# Patient Record
Sex: MALE | Race: WHITE | NOT HISPANIC OR LATINO | Employment: OTHER | URBAN - NONMETROPOLITAN AREA
[De-identification: names, ages, dates, MRNs, and addresses within clinical notes are randomized per-mention and may not be internally consistent; named-entity substitution may affect disease eponyms.]

---

## 2023-03-20 DIAGNOSIS — I10 HYPERTENSION, UNSPECIFIED TYPE: ICD-10-CM

## 2023-03-20 RX ORDER — LISINOPRIL 40 MG/1
40 TABLET ORAL DAILY
COMMUNITY
End: 2023-03-20 | Stop reason: SDUPTHER

## 2023-03-20 RX ORDER — LISINOPRIL 40 MG/1
40 TABLET ORAL DAILY
Qty: 30 TABLET | Refills: 1 | Status: SHIPPED | OUTPATIENT
Start: 2023-03-20 | End: 2023-03-24 | Stop reason: SDUPTHER

## 2023-03-21 PROBLEM — F17.200 SMOKER: Status: ACTIVE | Noted: 2023-03-21

## 2023-03-21 PROBLEM — E78.5 HYPERLIPIDEMIA: Status: ACTIVE | Noted: 2023-03-21

## 2023-03-21 PROBLEM — R91.1 LUNG NODULE: Status: ACTIVE | Noted: 2023-03-21

## 2023-03-21 PROBLEM — I65.29 CAROTID STENOSIS: Status: ACTIVE | Noted: 2023-03-21

## 2023-03-21 PROBLEM — I73.9 PVD (PERIPHERAL VASCULAR DISEASE) (CMS-HCC): Status: ACTIVE | Noted: 2023-03-21

## 2023-03-21 PROBLEM — I71.20 THORACIC AORTIC ANEURYSM WITHOUT RUPTURE (CMS-HCC): Status: ACTIVE | Noted: 2023-03-21

## 2023-03-21 PROBLEM — F33.2 ENDOGENOUS DEPRESSION (MULTI): Status: ACTIVE | Noted: 2023-03-21

## 2023-03-21 PROBLEM — Z95.0 PACEMAKER, ARTIFICIAL: Status: ACTIVE | Noted: 2023-03-21

## 2023-03-21 PROBLEM — I44.2 COMPLETE AV BLOCK (MULTI): Status: ACTIVE | Noted: 2023-03-21

## 2023-03-21 PROBLEM — J43.9 EMPHYSEMA/COPD (MULTI): Status: ACTIVE | Noted: 2023-03-21

## 2023-03-21 PROBLEM — R07.81 RIB PAIN: Status: ACTIVE | Noted: 2023-03-21

## 2023-03-21 PROBLEM — R79.89 LOW VITAMIN B12 LEVEL: Status: ACTIVE | Noted: 2023-03-21

## 2023-03-21 PROBLEM — I45.10 RIGHT BUNDLE BRANCH BLOCK: Status: ACTIVE | Noted: 2023-03-21

## 2023-03-21 PROBLEM — N52.9 ERECTILE DYSFUNCTION: Status: ACTIVE | Noted: 2023-03-21

## 2023-03-21 PROBLEM — I44.4 LAFB (LEFT ANTERIOR FASCICULAR BLOCK): Status: ACTIVE | Noted: 2023-03-21

## 2023-03-21 PROBLEM — I10 BENIGN HYPERTENSION: Status: ACTIVE | Noted: 2023-03-21

## 2023-03-21 PROBLEM — D12.6 TUBULAR ADENOMA OF COLON: Status: ACTIVE | Noted: 2023-03-21

## 2023-03-21 RX ORDER — CHLORTHALIDONE 25 MG/1
1 TABLET ORAL DAILY
COMMUNITY
Start: 2022-03-11 | End: 2023-08-30 | Stop reason: SDUPTHER

## 2023-03-21 RX ORDER — GUAIFENESIN 600 MG/1
1 TABLET, EXTENDED RELEASE ORAL 2 TIMES DAILY PRN
COMMUNITY
Start: 2022-10-27 | End: 2023-03-24 | Stop reason: SDUPTHER

## 2023-03-21 RX ORDER — TIOTROPIUM BROMIDE 18 UG/1
CAPSULE ORAL; RESPIRATORY (INHALATION)
COMMUNITY
Start: 2018-10-25

## 2023-03-21 RX ORDER — ALBUTEROL SULFATE 90 UG/1
AEROSOL, METERED RESPIRATORY (INHALATION)
COMMUNITY

## 2023-03-21 RX ORDER — CYCLOBENZAPRINE HCL 5 MG
1 TABLET ORAL 3 TIMES DAILY PRN
COMMUNITY
Start: 2022-10-27 | End: 2023-03-24 | Stop reason: WASHOUT

## 2023-03-21 RX ORDER — ASPIRIN 81 MG/1
81 TABLET ORAL DAILY
COMMUNITY

## 2023-03-21 RX ORDER — AMLODIPINE BESYLATE 10 MG/1
1 TABLET ORAL DAILY
COMMUNITY
Start: 2019-04-01 | End: 2023-03-24 | Stop reason: SDUPTHER

## 2023-03-21 RX ORDER — MIRTAZAPINE 15 MG/1
TABLET, FILM COATED ORAL
COMMUNITY
Start: 2019-02-20 | End: 2023-08-30 | Stop reason: SDUPTHER

## 2023-03-21 RX ORDER — ATORVASTATIN CALCIUM 20 MG/1
1 TABLET, FILM COATED ORAL DAILY
COMMUNITY
Start: 2019-02-20 | End: 2023-08-30 | Stop reason: SDUPTHER

## 2023-03-24 ENCOUNTER — OFFICE VISIT (OUTPATIENT)
Dept: PRIMARY CARE | Facility: CLINIC | Age: 73
End: 2023-03-24
Payer: MEDICARE

## 2023-03-24 VITALS
WEIGHT: 205.7 LBS | HEART RATE: 64 BPM | BODY MASS INDEX: 29.45 KG/M2 | SYSTOLIC BLOOD PRESSURE: 128 MMHG | HEIGHT: 70 IN | DIASTOLIC BLOOD PRESSURE: 66 MMHG

## 2023-03-24 DIAGNOSIS — Z00.00 ROUTINE GENERAL MEDICAL EXAMINATION AT HEALTH CARE FACILITY: Primary | ICD-10-CM

## 2023-03-24 DIAGNOSIS — I71.21 ANEURYSM OF ASCENDING AORTA WITHOUT RUPTURE (CMS-HCC): ICD-10-CM

## 2023-03-24 DIAGNOSIS — F33.2 ENDOGENOUS DEPRESSION (MULTI): ICD-10-CM

## 2023-03-24 DIAGNOSIS — J43.8 OTHER EMPHYSEMA (MULTI): ICD-10-CM

## 2023-03-24 DIAGNOSIS — F17.200 SMOKER: ICD-10-CM

## 2023-03-24 DIAGNOSIS — Z95.0 PACEMAKER, ARTIFICIAL: ICD-10-CM

## 2023-03-24 DIAGNOSIS — E78.49 OTHER HYPERLIPIDEMIA: ICD-10-CM

## 2023-03-24 DIAGNOSIS — I10 HYPERTENSION, UNSPECIFIED TYPE: ICD-10-CM

## 2023-03-24 DIAGNOSIS — I65.29 STENOSIS OF CAROTID ARTERY, UNSPECIFIED LATERALITY: ICD-10-CM

## 2023-03-24 DIAGNOSIS — I10 BENIGN HYPERTENSION: ICD-10-CM

## 2023-03-24 DIAGNOSIS — D12.6 TUBULAR ADENOMA OF COLON: ICD-10-CM

## 2023-03-24 DIAGNOSIS — R79.89 LOW VITAMIN B12 LEVEL: ICD-10-CM

## 2023-03-24 PROBLEM — R07.81 RIB PAIN: Status: RESOLVED | Noted: 2023-03-21 | Resolved: 2023-03-24

## 2023-03-24 PROBLEM — N52.9 ERECTILE DYSFUNCTION: Status: RESOLVED | Noted: 2023-03-21 | Resolved: 2023-03-24

## 2023-03-24 PROCEDURE — 99214 OFFICE O/P EST MOD 30 MIN: CPT | Performed by: FAMILY MEDICINE

## 2023-03-24 PROCEDURE — 4004F PT TOBACCO SCREEN RCVD TLK: CPT | Performed by: FAMILY MEDICINE

## 2023-03-24 PROCEDURE — 3078F DIAST BP <80 MM HG: CPT | Performed by: FAMILY MEDICINE

## 2023-03-24 PROCEDURE — 1160F RVW MEDS BY RX/DR IN RCRD: CPT | Performed by: FAMILY MEDICINE

## 2023-03-24 PROCEDURE — G0439 PPPS, SUBSEQ VISIT: HCPCS | Performed by: FAMILY MEDICINE

## 2023-03-24 PROCEDURE — 1159F MED LIST DOCD IN RCRD: CPT | Performed by: FAMILY MEDICINE

## 2023-03-24 PROCEDURE — 1124F ACP DISCUSS-NO DSCNMKR DOCD: CPT | Performed by: FAMILY MEDICINE

## 2023-03-24 PROCEDURE — 3074F SYST BP LT 130 MM HG: CPT | Performed by: FAMILY MEDICINE

## 2023-03-24 PROCEDURE — 1170F FXNL STATUS ASSESSED: CPT | Performed by: FAMILY MEDICINE

## 2023-03-24 RX ORDER — LISINOPRIL 40 MG/1
40 TABLET ORAL DAILY
Qty: 90 TABLET | Refills: 1 | Status: SHIPPED | OUTPATIENT
Start: 2023-03-24 | End: 2023-08-30 | Stop reason: SDUPTHER

## 2023-03-24 RX ORDER — GUAIFENESIN 600 MG/1
600 TABLET, EXTENDED RELEASE ORAL 2 TIMES DAILY PRN
Qty: 60 TABLET | Refills: 3 | Status: SHIPPED | OUTPATIENT
Start: 2023-03-24

## 2023-03-24 RX ORDER — LANOLIN ALCOHOL/MO/W.PET/CERES
1000 CREAM (GRAM) TOPICAL DAILY
COMMUNITY

## 2023-03-24 RX ORDER — AMLODIPINE BESYLATE 10 MG/1
10 TABLET ORAL DAILY
Qty: 90 TABLET | Refills: 1 | Status: SHIPPED | OUTPATIENT
Start: 2023-03-24 | End: 2023-08-30 | Stop reason: SDUPTHER

## 2023-03-24 ASSESSMENT — PATIENT HEALTH QUESTIONNAIRE - PHQ9
SUM OF ALL RESPONSES TO PHQ9 QUESTIONS 1 AND 2: 0
2. FEELING DOWN, DEPRESSED OR HOPELESS: NOT AT ALL
1. LITTLE INTEREST OR PLEASURE IN DOING THINGS: NOT AT ALL

## 2023-03-24 ASSESSMENT — ACTIVITIES OF DAILY LIVING (ADL)
DOING_HOUSEWORK: INDEPENDENT
MANAGING_FINANCES: INDEPENDENT
TAKING_MEDICATION: INDEPENDENT
DRESSING: INDEPENDENT
BATHING: INDEPENDENT
GROCERY_SHOPPING: INDEPENDENT

## 2023-03-24 NOTE — PATIENT INSTRUCTIONS
Labs soon.  Recommend routine follow up at least every six months.  Call concerns.  If you decide you want to quit smoking, and want help let us know.

## 2023-03-30 ENCOUNTER — TELEPHONE (OUTPATIENT)
Dept: PRIMARY CARE | Facility: CLINIC | Age: 73
End: 2023-03-30

## 2023-03-30 ENCOUNTER — LAB (OUTPATIENT)
Dept: LAB | Facility: LAB | Age: 73
End: 2023-03-30
Payer: MEDICARE

## 2023-03-30 DIAGNOSIS — E78.49 OTHER HYPERLIPIDEMIA: ICD-10-CM

## 2023-03-30 DIAGNOSIS — R79.89 LOW VITAMIN B12 LEVEL: ICD-10-CM

## 2023-03-30 LAB
ALANINE AMINOTRANSFERASE (SGPT) (U/L) IN SER/PLAS: 24 U/L (ref 10–52)
ALBUMIN (G/DL) IN SER/PLAS: 4.3 G/DL (ref 3.4–5)
ALBUMIN (MG/L) IN URINE: 10.9 MG/L
ALBUMIN/CREATININE (UG/MG) IN URINE: 9.6 UG/MG CRT (ref 0–30)
ALKALINE PHOSPHATASE (U/L) IN SER/PLAS: 76 U/L (ref 33–136)
ANION GAP IN SER/PLAS: 11 MMOL/L (ref 10–20)
ASPARTATE AMINOTRANSFERASE (SGOT) (U/L) IN SER/PLAS: 19 U/L (ref 9–39)
BASOPHILS (10*3/UL) IN BLOOD BY AUTOMATED COUNT: 0.08 X10E9/L (ref 0–0.1)
BASOPHILS/100 LEUKOCYTES IN BLOOD BY AUTOMATED COUNT: 0.7 % (ref 0–2)
BILIRUBIN TOTAL (MG/DL) IN SER/PLAS: 0.5 MG/DL (ref 0–1.2)
CALCIUM (MG/DL) IN SER/PLAS: 9.4 MG/DL (ref 8.6–10.3)
CARBON DIOXIDE, TOTAL (MMOL/L) IN SER/PLAS: 27 MMOL/L (ref 21–32)
CHLORIDE (MMOL/L) IN SER/PLAS: 104 MMOL/L (ref 98–107)
CHOLESTEROL (MG/DL) IN SER/PLAS: 128 MG/DL (ref 0–199)
CHOLESTEROL IN HDL (MG/DL) IN SER/PLAS: 48 MG/DL
CHOLESTEROL/HDL RATIO: 2.7
COBALAMIN (VITAMIN B12) (PG/ML) IN SER/PLAS: 615 PG/ML (ref 211–911)
CREATININE (MG/DL) IN SER/PLAS: 1.24 MG/DL (ref 0.5–1.3)
CREATININE (MG/DL) IN URINE: 114 MG/DL (ref 20–370)
EOSINOPHILS (10*3/UL) IN BLOOD BY AUTOMATED COUNT: 0.17 X10E9/L (ref 0–0.4)
EOSINOPHILS/100 LEUKOCYTES IN BLOOD BY AUTOMATED COUNT: 1.6 % (ref 0–6)
ERYTHROCYTE DISTRIBUTION WIDTH (RATIO) BY AUTOMATED COUNT: 13.1 % (ref 11.5–14.5)
ERYTHROCYTE MEAN CORPUSCULAR HEMOGLOBIN CONCENTRATION (G/DL) BY AUTOMATED: 32.3 G/DL (ref 32–36)
ERYTHROCYTE MEAN CORPUSCULAR VOLUME (FL) BY AUTOMATED COUNT: 95 FL (ref 80–100)
ERYTHROCYTES (10*6/UL) IN BLOOD BY AUTOMATED COUNT: 4.59 X10E12/L (ref 4.5–5.9)
GFR MALE: 61 ML/MIN/1.73M2
GLUCOSE (MG/DL) IN SER/PLAS: 91 MG/DL (ref 74–99)
HEMATOCRIT (%) IN BLOOD BY AUTOMATED COUNT: 43.6 % (ref 41–52)
HEMOGLOBIN (G/DL) IN BLOOD: 14.1 G/DL (ref 13.5–17.5)
IMMATURE GRANULOCYTES/100 LEUKOCYTES IN BLOOD BY AUTOMATED COUNT: 0.4 % (ref 0–0.9)
LDL: 67 MG/DL (ref 0–99)
LEUKOCYTES (10*3/UL) IN BLOOD BY AUTOMATED COUNT: 10.7 X10E9/L (ref 4.4–11.3)
LYMPHOCYTES (10*3/UL) IN BLOOD BY AUTOMATED COUNT: 2.34 X10E9/L (ref 0.8–3)
LYMPHOCYTES/100 LEUKOCYTES IN BLOOD BY AUTOMATED COUNT: 21.9 % (ref 13–44)
MONOCYTES (10*3/UL) IN BLOOD BY AUTOMATED COUNT: 0.82 X10E9/L (ref 0.05–0.8)
MONOCYTES/100 LEUKOCYTES IN BLOOD BY AUTOMATED COUNT: 7.7 % (ref 2–10)
NEUTROPHILS (10*3/UL) IN BLOOD BY AUTOMATED COUNT: 7.22 X10E9/L (ref 1.6–5.5)
NEUTROPHILS/100 LEUKOCYTES IN BLOOD BY AUTOMATED COUNT: 67.7 % (ref 40–80)
PLATELETS (10*3/UL) IN BLOOD AUTOMATED COUNT: 201 X10E9/L (ref 150–450)
POTASSIUM (MMOL/L) IN SER/PLAS: 4.3 MMOL/L (ref 3.5–5.3)
PROTEIN TOTAL: 6.5 G/DL (ref 6.4–8.2)
RBC, URINE: 1 /HPF (ref 0–5)
SODIUM (MMOL/L) IN SER/PLAS: 138 MMOL/L (ref 136–145)
SQUAMOUS EPITHELIAL CELLS, URINE: 1 /HPF
THYROTROPIN (MIU/L) IN SER/PLAS BY DETECTION LIMIT <= 0.05 MIU/L: 0.59 MIU/L (ref 0.44–3.98)
TRIGLYCERIDE (MG/DL) IN SER/PLAS: 65 MG/DL (ref 0–149)
UREA NITROGEN (MG/DL) IN SER/PLAS: 23 MG/DL (ref 6–23)
VLDL: 13 MG/DL (ref 0–40)
WBC, URINE: 5 /HPF (ref 0–5)

## 2023-03-30 PROCEDURE — 80053 COMPREHEN METABOLIC PANEL: CPT

## 2023-03-30 PROCEDURE — 85025 COMPLETE CBC W/AUTO DIFF WBC: CPT

## 2023-03-30 PROCEDURE — 80061 LIPID PANEL: CPT

## 2023-03-30 PROCEDURE — 84443 ASSAY THYROID STIM HORMONE: CPT

## 2023-03-30 PROCEDURE — 36415 COLL VENOUS BLD VENIPUNCTURE: CPT

## 2023-03-30 PROCEDURE — 82043 UR ALBUMIN QUANTITATIVE: CPT

## 2023-03-30 PROCEDURE — 82607 VITAMIN B-12: CPT

## 2023-03-30 PROCEDURE — 81001 URINALYSIS AUTO W/SCOPE: CPT

## 2023-03-30 PROCEDURE — 82570 ASSAY OF URINE CREATININE: CPT

## 2023-03-30 NOTE — TELEPHONE ENCOUNTER
----- Message from Flory Hopson MD sent at 3/30/2023  2:39 PM EDT -----  Can you please let him know his labs came back fine.  Thanks.

## 2023-05-31 ENCOUNTER — TELEPHONE (OUTPATIENT)
Dept: PHARMACY | Facility: HOSPITAL | Age: 73
End: 2023-05-31
Payer: MEDICARE

## 2023-08-30 DIAGNOSIS — I10 BENIGN HYPERTENSION: ICD-10-CM

## 2023-08-30 DIAGNOSIS — E78.5 HYPERLIPIDEMIA, UNSPECIFIED HYPERLIPIDEMIA TYPE: ICD-10-CM

## 2023-08-30 DIAGNOSIS — I10 HYPERTENSION, UNSPECIFIED TYPE: ICD-10-CM

## 2023-08-30 DIAGNOSIS — F33.2 ENDOGENOUS DEPRESSION (MULTI): ICD-10-CM

## 2023-08-30 RX ORDER — AMLODIPINE BESYLATE 10 MG/1
10 TABLET ORAL DAILY
Qty: 90 TABLET | Refills: 0 | Status: SHIPPED | OUTPATIENT
Start: 2023-08-30 | End: 2024-03-04

## 2023-08-30 RX ORDER — CHLORTHALIDONE 25 MG/1
25 TABLET ORAL DAILY
Qty: 90 TABLET | Refills: 0 | Status: SHIPPED | OUTPATIENT
Start: 2023-08-30 | End: 2024-03-04

## 2023-08-30 RX ORDER — ATORVASTATIN CALCIUM 20 MG/1
20 TABLET, FILM COATED ORAL DAILY
Qty: 90 TABLET | Refills: 0 | Status: SHIPPED | OUTPATIENT
Start: 2023-08-30 | End: 2023-11-27

## 2023-08-30 RX ORDER — MIRTAZAPINE 15 MG/1
TABLET, FILM COATED ORAL
Qty: 90 TABLET | Refills: 0 | Status: SHIPPED | OUTPATIENT
Start: 2023-08-30 | End: 2023-11-27

## 2023-08-30 RX ORDER — LISINOPRIL 40 MG/1
40 TABLET ORAL DAILY
Qty: 90 TABLET | Refills: 0 | Status: SHIPPED | OUTPATIENT
Start: 2023-08-30 | End: 2024-01-16 | Stop reason: SDUPTHER

## 2023-09-21 ENCOUNTER — OFFICE VISIT (OUTPATIENT)
Dept: PRIMARY CARE | Facility: CLINIC | Age: 73
End: 2023-09-21
Payer: MEDICARE

## 2023-09-21 VITALS
DIASTOLIC BLOOD PRESSURE: 78 MMHG | HEART RATE: 60 BPM | HEIGHT: 70 IN | WEIGHT: 207.4 LBS | BODY MASS INDEX: 29.69 KG/M2 | SYSTOLIC BLOOD PRESSURE: 138 MMHG

## 2023-09-21 DIAGNOSIS — Z91.148 NON COMPLIANCE W MEDICATION REGIMEN: ICD-10-CM

## 2023-09-21 DIAGNOSIS — J43.8 OTHER EMPHYSEMA (MULTI): ICD-10-CM

## 2023-09-21 DIAGNOSIS — F17.200 SMOKER: ICD-10-CM

## 2023-09-21 DIAGNOSIS — Z23 NEED FOR INFLUENZA VACCINATION: ICD-10-CM

## 2023-09-21 DIAGNOSIS — F33.2 ENDOGENOUS DEPRESSION (MULTI): ICD-10-CM

## 2023-09-21 DIAGNOSIS — I44.2 COMPLETE AV BLOCK (MULTI): Primary | ICD-10-CM

## 2023-09-21 DIAGNOSIS — I10 BENIGN HYPERTENSION: ICD-10-CM

## 2023-09-21 PROBLEM — R79.89 LOW VITAMIN B12 LEVEL: Status: RESOLVED | Noted: 2023-03-21 | Resolved: 2023-09-21

## 2023-09-21 PROBLEM — I44.4 LAFB (LEFT ANTERIOR FASCICULAR BLOCK): Status: RESOLVED | Noted: 2023-03-21 | Resolved: 2023-09-21

## 2023-09-21 PROBLEM — I45.10 RIGHT BUNDLE BRANCH BLOCK: Status: RESOLVED | Noted: 2023-03-21 | Resolved: 2023-09-21

## 2023-09-21 PROCEDURE — 4004F PT TOBACCO SCREEN RCVD TLK: CPT | Performed by: FAMILY MEDICINE

## 2023-09-21 PROCEDURE — 3078F DIAST BP <80 MM HG: CPT | Performed by: FAMILY MEDICINE

## 2023-09-21 PROCEDURE — 1159F MED LIST DOCD IN RCRD: CPT | Performed by: FAMILY MEDICINE

## 2023-09-21 PROCEDURE — 3075F SYST BP GE 130 - 139MM HG: CPT | Performed by: FAMILY MEDICINE

## 2023-09-21 PROCEDURE — 99214 OFFICE O/P EST MOD 30 MIN: CPT | Performed by: FAMILY MEDICINE

## 2023-09-21 PROCEDURE — G0008 ADMIN INFLUENZA VIRUS VAC: HCPCS | Performed by: FAMILY MEDICINE

## 2023-09-21 PROCEDURE — 90662 IIV NO PRSV INCREASED AG IM: CPT | Performed by: FAMILY MEDICINE

## 2023-09-21 PROCEDURE — 1160F RVW MEDS BY RX/DR IN RCRD: CPT | Performed by: FAMILY MEDICINE

## 2023-09-21 NOTE — PROGRESS NOTES
Patient presents for periodic surveillance of chronic medical problems.     Subjective  Melecio Rivera is a 73 y.o. male who presents for Annual Exam.  HPI  Smoking cessation advised.  Not using spiriva daily, declines any other maintenance treatments for copd.  Tailbone hurts when he wakes up or if he walks.  Declines any further testing.  Sees cardiology and vascular for thoracic artery aneurysm, pvd and carotid stenosis  Depression, stable, wishes to remain on current regimen    Due for high dose influenza vaccine.    Review of Systems   All other systems reviewed and are negative.  .    Objective     Visit Vitals  /78 (BP Location: Left arm, Patient Position: Sitting)   Pulse 60      Physical Exam  Vitals and nursing note reviewed.   Constitutional:       General: He is not in acute distress.     Appearance: Normal appearance. He is not toxic-appearing.      Comments: Room smells of tobacco   HENT:      Head: Normocephalic and atraumatic.   Cardiovascular:      Rate and Rhythm: Normal rate and regular rhythm.      Heart sounds: No murmur heard.  Pulmonary:      Effort: Pulmonary effort is normal. No respiratory distress.      Breath sounds: Normal breath sounds.      Comments: Prolonged expiration  Musculoskeletal:      Cervical back: Neck supple. No rigidity.      Comments:     Skin:     General: Skin is warm and dry.   Neurological:      General: No focal deficit present.      Mental Status: He is alert and oriented to person, place, and time.   Psychiatric:         Mood and Affect: Mood normal.         Behavior: Behavior normal.         Assessment/Plan   Problem List Items Addressed This Visit       Benign hypertension    Relevant Orders    CBC and Auto Differential    Comprehensive Metabolic Panel    Lipid Panel    TSH with reflex to Free T4 if abnormal    Vitamin B12    RESOLVED: Complete AV block (CMS/HCC) - Primary    Emphysema/COPD (CMS/HCC)    Endogenous depression (CMS/HCC)    Smoker     Other  Visit Diagnoses       Need for influenza vaccination        Relevant Orders    Flu vaccine, quadrivalent, high-dose, preservative free, age 65y+ (FLUZONE) (Completed)    Non compliance w medication regimen                       Flory Hopson MD

## 2023-09-21 NOTE — PROGRESS NOTES
Med check; states when he first gets up has pain in his tailbone but it goes away once he is up and moving around

## 2023-10-31 ENCOUNTER — HOSPITAL ENCOUNTER (OUTPATIENT)
Dept: CARDIOLOGY | Facility: CLINIC | Age: 73
Discharge: HOME | End: 2023-10-31
Payer: MEDICARE

## 2023-10-31 DIAGNOSIS — Z95.0 PACEMAKER, ARTIFICIAL: ICD-10-CM

## 2023-10-31 DIAGNOSIS — I44.2 ATRIOVENTRICULAR BLOCK, COMPLETE (MULTI): ICD-10-CM

## 2023-10-31 PROCEDURE — 93296 REM INTERROG EVL PM/IDS: CPT

## 2023-11-14 PROCEDURE — 93294 REM INTERROG EVL PM/LDLS PM: CPT | Performed by: INTERNAL MEDICINE

## 2023-11-23 DIAGNOSIS — E78.5 HYPERLIPIDEMIA, UNSPECIFIED HYPERLIPIDEMIA TYPE: ICD-10-CM

## 2023-11-27 RX ORDER — ATORVASTATIN CALCIUM 20 MG/1
20 TABLET, FILM COATED ORAL DAILY
Qty: 90 TABLET | Refills: 1 | Status: SHIPPED | OUTPATIENT
Start: 2023-11-27

## 2024-01-16 DIAGNOSIS — I10 HYPERTENSION, UNSPECIFIED TYPE: ICD-10-CM

## 2024-01-16 RX ORDER — LISINOPRIL 40 MG/1
40 TABLET ORAL DAILY
Qty: 90 TABLET | Refills: 1 | Status: SHIPPED | OUTPATIENT
Start: 2024-01-16

## 2024-01-18 DIAGNOSIS — Z95.0 PACEMAKER, ARTIFICIAL: Primary | ICD-10-CM

## 2024-01-18 DIAGNOSIS — I44.2 ATRIOVENTRICULAR BLOCK, COMPLETE (MULTI): ICD-10-CM

## 2024-01-23 ENCOUNTER — APPOINTMENT (OUTPATIENT)
Dept: CARDIOLOGY | Facility: HOSPITAL | Age: 74
End: 2024-01-23
Payer: MEDICARE

## 2024-01-30 ENCOUNTER — HOSPITAL ENCOUNTER (OUTPATIENT)
Dept: CARDIOLOGY | Facility: CLINIC | Age: 74
Discharge: HOME | End: 2024-01-30
Payer: MEDICARE

## 2024-01-30 DIAGNOSIS — Z95.0 PACEMAKER, ARTIFICIAL: ICD-10-CM

## 2024-01-30 DIAGNOSIS — I44.2 ATRIOVENTRICULAR BLOCK, COMPLETE (MULTI): ICD-10-CM

## 2024-01-30 PROCEDURE — 93296 REM INTERROG EVL PM/IDS: CPT

## 2024-02-15 ENCOUNTER — HOSPITAL ENCOUNTER (OUTPATIENT)
Dept: CARDIOLOGY | Facility: HOSPITAL | Age: 74
Discharge: HOME | End: 2024-02-15
Payer: MEDICARE

## 2024-02-15 ENCOUNTER — LAB (OUTPATIENT)
Dept: LAB | Facility: LAB | Age: 74
End: 2024-02-15
Payer: MEDICARE

## 2024-02-15 DIAGNOSIS — Z95.0 PACEMAKER, ARTIFICIAL: Primary | ICD-10-CM

## 2024-02-15 DIAGNOSIS — I44.2 ATRIOVENTRICULAR BLOCK, COMPLETE (MULTI): ICD-10-CM

## 2024-02-15 DIAGNOSIS — Z95.0 PACEMAKER, ARTIFICIAL: ICD-10-CM

## 2024-02-15 DIAGNOSIS — I10 BENIGN HYPERTENSION: ICD-10-CM

## 2024-02-15 LAB
ALBUMIN SERPL BCP-MCNC: 4.1 G/DL (ref 3.4–5)
ALP SERPL-CCNC: 80 U/L (ref 33–136)
ALT SERPL W P-5'-P-CCNC: 19 U/L (ref 10–52)
ANION GAP SERPL CALC-SCNC: 10 MMOL/L (ref 10–20)
AST SERPL W P-5'-P-CCNC: 17 U/L (ref 9–39)
BASOPHILS # BLD AUTO: 0.07 X10*3/UL (ref 0–0.1)
BASOPHILS NFR BLD AUTO: 0.7 %
BILIRUB SERPL-MCNC: 0.6 MG/DL (ref 0–1.2)
BUN SERPL-MCNC: 35 MG/DL (ref 6–23)
CALCIUM SERPL-MCNC: 9.3 MG/DL (ref 8.6–10.3)
CHLORIDE SERPL-SCNC: 105 MMOL/L (ref 98–107)
CHOLEST SERPL-MCNC: 117 MG/DL (ref 0–199)
CHOLESTEROL/HDL RATIO: 3.7
CO2 SERPL-SCNC: 28 MMOL/L (ref 21–32)
CREAT SERPL-MCNC: 1.49 MG/DL (ref 0.5–1.3)
EGFRCR SERPLBLD CKD-EPI 2021: 49 ML/MIN/1.73M*2
EOSINOPHIL # BLD AUTO: 0.15 X10*3/UL (ref 0–0.4)
EOSINOPHIL NFR BLD AUTO: 1.4 %
ERYTHROCYTE [DISTWIDTH] IN BLOOD BY AUTOMATED COUNT: 11.9 % (ref 11.5–14.5)
GLUCOSE SERPL-MCNC: 89 MG/DL (ref 74–99)
HCT VFR BLD AUTO: 45.1 % (ref 41–52)
HDLC SERPL-MCNC: 32 MG/DL
HGB BLD-MCNC: 14.8 G/DL (ref 13.5–17.5)
IMM GRANULOCYTES # BLD AUTO: 0.05 X10*3/UL (ref 0–0.5)
IMM GRANULOCYTES NFR BLD AUTO: 0.5 % (ref 0–0.9)
LDLC SERPL CALC-MCNC: 64 MG/DL
LYMPHOCYTES # BLD AUTO: 2.04 X10*3/UL (ref 0.8–3)
LYMPHOCYTES NFR BLD AUTO: 19.4 %
MCH RBC QN AUTO: 30.9 PG (ref 26–34)
MCHC RBC AUTO-ENTMCNC: 32.8 G/DL (ref 32–36)
MCV RBC AUTO: 94 FL (ref 80–100)
MONOCYTES # BLD AUTO: 0.76 X10*3/UL (ref 0.05–0.8)
MONOCYTES NFR BLD AUTO: 7.2 %
NEUTROPHILS # BLD AUTO: 7.42 X10*3/UL (ref 1.6–5.5)
NEUTROPHILS NFR BLD AUTO: 70.8 %
NON HDL CHOLESTEROL: 85 MG/DL (ref 0–149)
NRBC BLD-RTO: 0 /100 WBCS (ref 0–0)
PLATELET # BLD AUTO: 178 X10*3/UL (ref 150–450)
POTASSIUM SERPL-SCNC: 4.3 MMOL/L (ref 3.5–5.3)
PROT SERPL-MCNC: 6.7 G/DL (ref 6.4–8.2)
RBC # BLD AUTO: 4.79 X10*6/UL (ref 4.5–5.9)
SODIUM SERPL-SCNC: 139 MMOL/L (ref 136–145)
TRIGL SERPL-MCNC: 107 MG/DL (ref 0–149)
TSH SERPL-ACNC: 0.65 MIU/L (ref 0.44–3.98)
VIT B12 SERPL-MCNC: 227 PG/ML (ref 211–911)
VLDL: 21 MG/DL (ref 0–40)
WBC # BLD AUTO: 10.5 X10*3/UL (ref 4.4–11.3)

## 2024-02-15 PROCEDURE — 80053 COMPREHEN METABOLIC PANEL: CPT

## 2024-02-15 PROCEDURE — 93280 PM DEVICE PROGR EVAL DUAL: CPT

## 2024-02-15 PROCEDURE — 85025 COMPLETE CBC W/AUTO DIFF WBC: CPT

## 2024-02-15 PROCEDURE — 84443 ASSAY THYROID STIM HORMONE: CPT

## 2024-02-15 PROCEDURE — 36415 COLL VENOUS BLD VENIPUNCTURE: CPT

## 2024-02-15 PROCEDURE — 82607 VITAMIN B-12: CPT

## 2024-02-15 PROCEDURE — 93280 PM DEVICE PROGR EVAL DUAL: CPT | Performed by: STUDENT IN AN ORGANIZED HEALTH CARE EDUCATION/TRAINING PROGRAM

## 2024-02-15 PROCEDURE — 80061 LIPID PANEL: CPT

## 2024-03-02 DIAGNOSIS — I10 BENIGN HYPERTENSION: ICD-10-CM

## 2024-03-02 DIAGNOSIS — I10 HYPERTENSION, UNSPECIFIED TYPE: ICD-10-CM

## 2024-03-04 RX ORDER — AMLODIPINE BESYLATE 10 MG/1
10 TABLET ORAL DAILY
Qty: 90 TABLET | Refills: 1 | Status: SHIPPED | OUTPATIENT
Start: 2024-03-04

## 2024-03-04 RX ORDER — CHLORTHALIDONE 25 MG/1
25 TABLET ORAL DAILY
Qty: 90 TABLET | Refills: 1 | Status: SHIPPED | OUTPATIENT
Start: 2024-03-04

## 2024-03-21 ENCOUNTER — OFFICE VISIT (OUTPATIENT)
Dept: PRIMARY CARE | Facility: CLINIC | Age: 74
End: 2024-03-21
Payer: MEDICARE

## 2024-03-21 VITALS
DIASTOLIC BLOOD PRESSURE: 70 MMHG | HEIGHT: 70 IN | HEART RATE: 68 BPM | BODY MASS INDEX: 30.35 KG/M2 | SYSTOLIC BLOOD PRESSURE: 132 MMHG | WEIGHT: 212 LBS

## 2024-03-21 DIAGNOSIS — I10 BENIGN HYPERTENSION: ICD-10-CM

## 2024-03-21 DIAGNOSIS — G89.29 CHRONIC BILATERAL LOW BACK PAIN WITHOUT SCIATICA: ICD-10-CM

## 2024-03-21 DIAGNOSIS — Z00.00 ROUTINE GENERAL MEDICAL EXAMINATION AT HEALTH CARE FACILITY: Primary | ICD-10-CM

## 2024-03-21 DIAGNOSIS — J43.8 OTHER EMPHYSEMA (MULTI): ICD-10-CM

## 2024-03-21 DIAGNOSIS — N18.31 STAGE 3A CHRONIC KIDNEY DISEASE (MULTI): ICD-10-CM

## 2024-03-21 DIAGNOSIS — R82.90 ABNORMAL URINALYSIS: ICD-10-CM

## 2024-03-21 DIAGNOSIS — Z11.59 NEED FOR HEPATITIS C SCREENING TEST: ICD-10-CM

## 2024-03-21 DIAGNOSIS — F17.200 SMOKER: ICD-10-CM

## 2024-03-21 DIAGNOSIS — F33.2 ENDOGENOUS DEPRESSION (MULTI): ICD-10-CM

## 2024-03-21 DIAGNOSIS — M54.50 CHRONIC BILATERAL LOW BACK PAIN WITHOUT SCIATICA: ICD-10-CM

## 2024-03-21 DIAGNOSIS — I71.21 ANEURYSM OF ASCENDING AORTA WITHOUT RUPTURE (CMS-HCC): ICD-10-CM

## 2024-03-21 PROCEDURE — 87086 URINE CULTURE/COLONY COUNT: CPT

## 2024-03-21 PROCEDURE — 3078F DIAST BP <80 MM HG: CPT | Performed by: FAMILY MEDICINE

## 2024-03-21 PROCEDURE — 3075F SYST BP GE 130 - 139MM HG: CPT | Performed by: FAMILY MEDICINE

## 2024-03-21 PROCEDURE — G0439 PPPS, SUBSEQ VISIT: HCPCS | Performed by: FAMILY MEDICINE

## 2024-03-21 PROCEDURE — 99214 OFFICE O/P EST MOD 30 MIN: CPT | Performed by: FAMILY MEDICINE

## 2024-03-21 PROCEDURE — 1170F FXNL STATUS ASSESSED: CPT | Performed by: FAMILY MEDICINE

## 2024-03-21 PROCEDURE — 1124F ACP DISCUSS-NO DSCNMKR DOCD: CPT | Performed by: FAMILY MEDICINE

## 2024-03-21 PROCEDURE — 1160F RVW MEDS BY RX/DR IN RCRD: CPT | Performed by: FAMILY MEDICINE

## 2024-03-21 PROCEDURE — 36415 COLL VENOUS BLD VENIPUNCTURE: CPT

## 2024-03-21 PROCEDURE — 1159F MED LIST DOCD IN RCRD: CPT | Performed by: FAMILY MEDICINE

## 2024-03-21 ASSESSMENT — PATIENT HEALTH QUESTIONNAIRE - PHQ9
1. LITTLE INTEREST OR PLEASURE IN DOING THINGS: NOT AT ALL
SUM OF ALL RESPONSES TO PHQ9 QUESTIONS 1 AND 2: 0
2. FEELING DOWN, DEPRESSED OR HOPELESS: NOT AT ALL
2. FEELING DOWN, DEPRESSED OR HOPELESS: NOT AT ALL

## 2024-03-21 ASSESSMENT — ACTIVITIES OF DAILY LIVING (ADL)
TAKING_MEDICATION: INDEPENDENT
MANAGING_FINANCES: INDEPENDENT
GROCERY_SHOPPING: INDEPENDENT
BATHING: INDEPENDENT
DOING_HOUSEWORK: INDEPENDENT
DRESSING: INDEPENDENT

## 2024-03-21 NOTE — PROGRESS NOTES
"Subjective   Reason for Visit: Melecio Rivera is an 74 y.o. male here for a Medicare Wellness visit.     Past Medical, Surgical, and Family History reviewed and updated in chart.    Reviewed all medications by prescribing practitioner or clinical pharmacist (such as prescriptions, OTCs, herbal therapies and supplements) and documented in the medical record.    HPI  Low back pain for quite awhile, worse with movement, not sure how long, better with rest. No numbness/tingling down legs, no loss of bowel or bladder control, no urinary symptoms  Smokes, warned of risks, advised to quit, declines additional workup /treatment/testing for copd  Recommend he wear his hearing aids, reviewed risk reduction for dementia with that  Stage 3 kidney disease, discussed avoiding nsaids  Sees vascular for thoracic aortic aneurysm    Reviewed labs  Patient Care Team:  Flory Hopson MD as PCP - General (Family Medicine)  Flory Hopson MD as PCP - [employer] PCP     Review of Systems    Objective   Vitals:  /70 (BP Location: Left arm, Patient Position: Sitting)   Pulse 68   Ht 1.778 m (5' 10\")   Wt 96.2 kg (212 lb)   BMI 30.42 kg/m²       Physical Exam  Constitutional:       Appearance: He is not toxic-appearing.      Comments: Chronically ill appearing, room smells of cigarettes   HENT:      Head: Normocephalic.      Comments: Hard of hearing, no hearing aids today  Cardiovascular:      Rate and Rhythm: Normal rate and regular rhythm.   Pulmonary:      Effort: Pulmonary effort is normal.      Breath sounds: Normal breath sounds.      Comments: Prolonged expiration  Neurological:      General: No focal deficit present.      Mental Status: He is alert.         Assessment/Plan   Problem List Items Addressed This Visit       Benign hypertension    Emphysema/COPD (CMS/[facility])    Overview     Flory Hopson  Jan 05, 202110:26AM  Chronic Condition Documentation: Stable based on symptoms and exam. Continue established " treatment plan and follow-up at least yearly.         Endogenous depression (CMS/HCC)    Thoracic aortic aneurysm without rupture (CMS/HCC)    Overview     Flory Hopson  Jan 05, 202110:26AM  Chronic Condition Documentation: Stable based on symptoms and exam. Continue established treatment plan and follow-up at least yearly.         Smoker    Overview     20 cigarettes perday. Started age 18.         Stage 3a chronic kidney disease (CMS/HCC)     Other Visit Diagnoses       Routine general medical examination at health care facility    -  Primary    Chronic bilateral low back pain without sciatica        Relevant Orders    XR lumbar spine 2-3 views    Referral to Physical Therapy    Microscopic Only, Urine    Urine Culture    Abnormal urinalysis        Relevant Orders    Urine Culture    Need for hepatitis C screening test        Relevant Orders    Hepatitis C antibody

## 2024-03-22 LAB — BACTERIA UR CULT: NORMAL

## 2024-04-10 ENCOUNTER — EVALUATION (OUTPATIENT)
Dept: PHYSICAL THERAPY | Facility: CLINIC | Age: 74
End: 2024-04-10
Payer: MEDICARE

## 2024-04-10 DIAGNOSIS — G89.29 CHRONIC BILATERAL LOW BACK PAIN WITHOUT SCIATICA: Primary | ICD-10-CM

## 2024-04-10 DIAGNOSIS — M54.50 CHRONIC BILATERAL LOW BACK PAIN WITHOUT SCIATICA: Primary | ICD-10-CM

## 2024-04-10 PROCEDURE — 97161 PT EVAL LOW COMPLEX 20 MIN: CPT | Mod: GP | Performed by: PHYSICAL THERAPIST

## 2024-04-10 PROCEDURE — 97110 THERAPEUTIC EXERCISES: CPT | Mod: GP | Performed by: PHYSICAL THERAPIST

## 2024-04-10 ASSESSMENT — PAIN SCALES - GENERAL: PAINLEVEL_OUTOF10: 0 - NO PAIN

## 2024-04-10 ASSESSMENT — ENCOUNTER SYMPTOMS
DEPRESSION: 1
OCCASIONAL FEELINGS OF UNSTEADINESS: 1
LOSS OF SENSATION IN FEET: 0

## 2024-04-10 ASSESSMENT — PAIN - FUNCTIONAL ASSESSMENT: PAIN_FUNCTIONAL_ASSESSMENT: 0-10

## 2024-04-10 NOTE — PROGRESS NOTES
"Physical Therapy    Physical Therapy Lumbar Spine Evaluation    Patient Name: Melecio Rivera  MRN: [id number]  Today's Date: 4/10/2024  Time Calculation  Start Time: 1410  Stop Time: 1509  Time Calculation (min): 59 min  PT Evaluation Time Entry  PT Evaluation (Low) Time Entry: 26  PT Therapeutic Procedures Time Entry  Therapeutic Exercise Time Entry: 29      Current Problem  Problem List Items Addressed This Visit             ICD-10-CM    Chronic bilateral low back pain without sciatica - Primary M54.50, G89.29    Relevant Orders    Follow Up In Physical Therapy        SUBJECTIVE  Subjective   General  Reason for Referral: back pain  Referred By: Mark AnthonyPatient reports pain in lower back on both sides of spine \"near my kidneys\" and states everything feels like it's dropped down \"my stomach and stuff\".  C/o numbness/tingling in right thigh.  States sometimes \"it falls asleep\". States he has more pain after he walks for a while and when bending over.  States since he retired he \"doesn't do much\".  Patient reports son helps him fix things around the house.  Patient states he has knee problems too which inhibit him.     Precautions  Precautions  STEADI Fall Risk Score (The score of 4 or more indicates an increased risk of falling): 4  Medical Precautions: No known precautions/limitation       Pain  Pain Assessment: 0-10  Pain Score: 0 - No pain (at rest sitting with no pain;  states ; states when he gets pain \"it's an aggravation--it's there\".  \"it's not pain where you cant stand it...it's an ache\")  Location of pain: lower back, both sides    SUBJECTIVE:   Patient verified by name and .  Chief complaint: back pain    Imaging: has xray ordered on 3/21/24 but hasn't gotten it done yet    Pain Better; sitting/resting    Pain Worse: lifting, prolonged walking    Prior level of function painfree/less pain       Current limitations: walking distance, lifting/bending     Work: retired    Patients goal: less/no pain  Prior tx: " "none    Objective:  Standing: iliac crest height level, increased fullness along lower thoracic to lumbar paraspinals, significant tightness in lumbar paraspinals bilaterally, decreased lumbar lordosis    Lower Extremity ROM: WNL     Lower Extremity Strength:  Date: eval Myotome RIGHT LEFT   Hip Flexion L1,2 4 4   Hip ext  4 4   Hip abd      Hip add      Knee Extension L3 4+ 4+   Knee Flexion  4 4   Ankle DF L4 4+ 4+   Great toe Ext L5       Lumbar ROM:  50% deficit all    Neurological symptoms tingling/numbness R thigh    Outcome Measure  Other Measures  Oswestry Disablity Index (CONOR): 13  Other Measures  Oswestry Disablity Index (CONOR): 13    26% impairment    [facility]  TrA sobeida plantigrade with t/v cues and instructed for HEP  Hamstring seated stretch 30\" ea and HEP  Adductor standing stretch  30\" ea and HEP  Hip flexor (on chair) stretch  30\" ea and HEP  Gastroc standing stretch at wall  30\" ea and HEP  Body mechanics and back safety training: Tra sobeida with ADLS for back support, lifting mechanics    OP EDUCATION:  Access Code: [email or web address]  URL: [email or web address]  Date: 04/10/2024  Prepared by: Radha Espinal    Exercises  - Seated Hamstring Stretch with Chair  - 2 x daily - 7 x weekly - 1 sets - 2 reps - 30 sec hold  - Lateral Lunge Adductor Stretch with Counter Support  - 2 x daily - 7 x weekly - 1 sets - 2 reps - 30 sec hold  - Standing Hip Flexor Stretch on Chair  - 2 x daily - 7 x weekly - 1 sets - 2 reps - 30 sec hold  - Gastroc Stretch on Wall  - 2 x daily - 7 x weekly - 1 sets - 2 reps - 30 sec hold  - Quadruped Transversus Abdominis Bracing  - 2 x daily - 7 x weekly - 1 sets - 10 reps  - Supine Single Knee to Chest Stretch  - 2 x daily - 7 x weekly - 1 sets - 10 reps - 10 hold  - Supine Double Knee to Chest  - 2 x daily - 7 x weekly - 1 sets - 3 reps - 10 hold  ASSESSEMENT  Pt is a 74 y.o. referred to therapy for dx of LBP by Flory Hopson MD . Pt presents with " impairments in body mechanics and back safety, core and trunk weakness with muscle imbalance and tightness in Les musculature.  Patient has back pain symptoms with prolonged ambulation and lifting and bending.  He has significant tightness noted in lumbar region with fullness in R lumbar paraspinals more than left side.  Patient will need further education and review of proper body mechanics and back safety.  He had difficulty performing stretches and needed tactile cues and facilition to complete them with correct form.   Evaluation/Treatment Tolerance: Patient tolerated treatment well  Patient would benefit from a therapy program to restore prior level of function, decrease pain, increase AROM, increase strength and improve posture.    The physical therapy prognosis is good for the patient to achieve their goals.   The pt tolerated therapy treatment today well with no adverse effects.  Barriers to therapy include:  none    PLAN  PT Frequency: 2 times per week  Duration: 4 weeks      The pt has been educated about the risks and benefits of physical therapy including manual therapy treatments and gives consent for treatment.     The patient will benefit from physical therapy treatment to include: Treatment/Interventions: Dry needling, Education/ Instruction, Electrical stimulation, Manual therapy, Neuromuscular re-education, Self care/ home management, Therapeutic exercises (may include IASTM, cupping, dry needle back /lumbar area as needed)       Goals:  Active       PT Problem       PT Goal 1       Start:  04/10/24    Expected End:  05/30/24       Patient will be independent with HEP to improve strength, flexibility, and to reduce pain...   4 weeks    Patient will demonstrate good body mechanics and back safety with floor to waist lift, front carry, overhead lift...  4 weeks    Patient will report 50% less episodes (frequency and duration ) of tingling in R thigh...  4 weeks    Patient will have CONOR score of 10%  or less...  4 weeks

## 2024-04-18 ENCOUNTER — TREATMENT (OUTPATIENT)
Dept: PHYSICAL THERAPY | Facility: CLINIC | Age: 74
End: 2024-04-18
Payer: MEDICARE

## 2024-04-18 DIAGNOSIS — M54.50 CHRONIC BILATERAL LOW BACK PAIN WITHOUT SCIATICA: ICD-10-CM

## 2024-04-18 DIAGNOSIS — G89.29 CHRONIC BILATERAL LOW BACK PAIN WITHOUT SCIATICA: ICD-10-CM

## 2024-04-18 PROCEDURE — 97110 THERAPEUTIC EXERCISES: CPT | Mod: GP,CQ

## 2024-04-18 PROCEDURE — 97140 MANUAL THERAPY 1/> REGIONS: CPT | Mod: GP,CQ

## 2024-04-18 ASSESSMENT — PAIN SCALES - GENERAL: PAINLEVEL_OUTOF10: 5 - MODERATE PAIN

## 2024-04-18 ASSESSMENT — PAIN - FUNCTIONAL ASSESSMENT: PAIN_FUNCTIONAL_ASSESSMENT: 0-10

## 2024-04-18 NOTE — PROGRESS NOTES
Physical Therapy Treatment    Patient Name: Melecio Rivera  MRN: [id number]  Today's Date: 4/18/2024  Time Calculation  Start Time: 1215  Stop Time: 1255  Time Calculation (min): 40 min    PT Therapeutic Procedures Time Entry  Manual Therapy Time Entry: 15  Therapeutic Exercise Time Entry: 24         Subjective  Patient reports  25% compliance with HEP and expressed good understanding. Patient states that sitting relieves his back pain within 3 seconds. States that he isn't sure he can do the therapy exercises secondary to B/L knee pain. Decreased pain at end of session.          Precautions  Precautions  Medical Precautions: No known precautions/limitation  Pain  Pain Assessment: 0-10  Pain Score: 5 - Moderate pain  Pain Type: Chronic pain  Pain Location: Back    Assessment: Patient identified by name and date of birth. Performed supine core strengthening secondary to patient c/o of knee pain with standing exercises. Patient was challenged with TrA contraction, verbal and tactile cues required with patient continuing to compensate. Relief expressed with LTR. Performed manual STW to B/L Qls and paraspinals with patient demonstrating tenderness and increased tissue restriction, Improvement noted at the end of session.         Plan: Plan to continue with core strengthening to allow for improved trunk control and safety with lifting objects with ADLs. JW    OP PT Plan  Treatment/Interventions: Dry needling, Education/ Instruction, Electrical stimulation, Manual therapy, Neuromuscular re-education, Self care/ home management, Therapeutic exercises (may include IASTM, cupping, dry needle back /lumbar area as needed)  PT Plan: Skilled PT  PT Frequency: 2 times per week  Duration: 4 weeks  Onset Date: 04/10/23  Certification Period Start Date:  (need auth)  Rehab Potential: Good    Current Problem  1. Chronic bilateral low back pain without sciatica  Follow Up In Physical Therapy          Reason for Referral: back  "pain  Referred By: Mark Anthony  General Comment: 2/44    Treatments: 24'  Slant board 2 x 30\" N  Supine TrA contraction 10 x 5\" N  Supine March w/TrA 2x10  N  LTR w/TrA 10 x 3\" holds N    SKTC 10 x 5\" holds P  DKTC 5 x 5\" holds P    Manual: 15'  STW to B/L QL, paraspinals    Below for HEP  TrA sobeida plantigrade with t/v cues and instructed for HEP  Hamstring seated stretch 30\" ea and HEP  Adductor standing stretch  30\" ea and HEP  Hip flexor (on chair) stretch  30\" ea and HEP  Gastroc standing stretch at wall  30\" ea and HEP  Body mechanics and back safety training: Tra sobeida with ADLS for back support, lifting mechanics     OP EDUCATION:  Access Code: [email or web address]  URL: [email or web address]  Date: 04/10/2024  Prepared by: Radha Espinal     Exercises  - Seated Hamstring Stretch with Chair  - 2 x daily - 7 x weekly - 1 sets - 2 reps - 30 sec hold  - Lateral Lunge Adductor Stretch with Counter Support  - 2 x daily - 7 x weekly - 1 sets - 2 reps - 30 sec hold  - Standing Hip Flexor Stretch on Chair  - 2 x daily - 7 x weekly - 1 sets - 2 reps - 30 sec hold  - Gastroc Stretch on Wall  - 2 x daily - 7 x weekly - 1 sets - 2 reps - 30 sec hold  - Quadruped Transversus Abdominis Bracing  - 2 x daily - 7 x weekly - 1 sets - 10 reps  - Supine Single Knee to Chest Stretch  - 2 x daily - 7 x weekly - 1 sets - 10 reps - 10 hold  - Supine Double Knee to Chest  - 2 x daily - 7 x weekly - 1 sets - 3 reps - 10 hold  Access Code: [email or web address]  URL: [email or web address]  Date: 04/18/2024  Prepared by: Jolene Mccloud    Exercises  - Supine Transversus Abdominis Bracing - Hands on Stomach  - 1 x daily - 7 x weekly - 10 reps - 5\" hold  - Supine March  - 1 x daily - 7 x weekly - 3 sets - 10 reps  - Supine Lower Trunk Rotation  - 1 x daily - 7 x weekly - 10 reps - 5\" hold    ASSESSEMENT    Outpatient Education  Individual(s) Educated: Patient  Education Provided: Home Exercise Program  Diagnosis and " Precautions: low back pain without sciatica; no prec  Risk and Benefits Discussed with Patient/Caregiver/Other: yes  Patient/Caregiver Demonstrated Understanding: yes  Plan of Care Discussed and Agreed Upon: yes  Patient Response to Education: Patient/Caregiver Verbalized Understanding of Information, Patient/Caregiver Performed Return Demonstration of Exercises/Activities, Patient/Caregiver Asked Appropriate Questions  Education Comment: handout also given    Goals:  Active       PT Problem       PT Goal 1       Start:  04/10/24    Expected End:  05/30/24       Patient will be independent with HEP to improve strength, flexibility, and to reduce pain...   4 weeks    Patient will demonstrate good body mechanics and back safety with floor to waist lift, front carry, overhead lift...  4 weeks    Patient will report 50% less episodes (frequency and duration ) of tingling in R thigh...  4 weeks    Patient will have CONOR score of 10% or less...  4 weeks

## 2024-04-24 ENCOUNTER — TREATMENT (OUTPATIENT)
Dept: PHYSICAL THERAPY | Facility: CLINIC | Age: 74
End: 2024-04-24
Payer: MEDICARE

## 2024-04-24 DIAGNOSIS — G89.29 CHRONIC BILATERAL LOW BACK PAIN WITHOUT SCIATICA: ICD-10-CM

## 2024-04-24 DIAGNOSIS — M54.50 CHRONIC BILATERAL LOW BACK PAIN WITHOUT SCIATICA: ICD-10-CM

## 2024-04-24 PROCEDURE — 97110 THERAPEUTIC EXERCISES: CPT | Mod: GP,CQ

## 2024-04-24 ASSESSMENT — PAIN - FUNCTIONAL ASSESSMENT: PAIN_FUNCTIONAL_ASSESSMENT: 0-10

## 2024-04-24 ASSESSMENT — PAIN SCALES - GENERAL: PAINLEVEL_OUTOF10: 4

## 2024-04-24 NOTE — PROGRESS NOTES
"Physical Therapy Treatment    Patient Name: Melecio Rivera  MRN: [id number]  Today's Date: 4/24/2024  Time Calculation  Start Time: 1451  Stop Time: 1532  Time Calculation (min): 41 min  PT Therapeutic Procedures Time Entry  Therapeutic Exercise Time Entry: 39       Assessment:   Patient able to tolerate session with mild difficulty. Patient brenda's challenges with standing QL stretch on R compared to L. Patient challenged with SLR. Demo's no change in symptoms pre and post treatment.    Plan:  Continue to work on improving strength and decreasing pain to be able to get a full nights sleep uninterrupted. -AB.     OP PT Plan  Treatment/Interventions: Dry needling, Education/ Instruction, Electrical stimulation, Manual therapy, Neuromuscular re-education, Self care/ home management, Therapeutic exercises (may include IASTM, cupping, dry needle back /lumbar area as needed)  PT Plan: Skilled PT  PT Frequency: 2 times per week  Duration: 4 weeks  Onset Date: 04/10/23  Certification Period Start Date:  (need auth)  Rehab Potential: Good    Current Problem  Problem List Items Addressed This Visit             ICD-10-CM    Chronic bilateral low back pain without sciatica M54.50, G89.29       Subjective   General  Reason for Referral: back pain  Referred By: Mark Anthony  General Comment: 3/4  HEP: Not much  Patient states that his pain isn't constant.     Precautions  Precautions  Medical Precautions: No known precautions/limitation    Pain  Pain Assessment: 0-10  Pain Score: 4  Pain Location: Other (Comment) (Tailbone)    Objective     Treatments:  Therapeutic Exercise: 39 minutes, 3 units  SciFit x6' (N)  Slant board 2 x 1' (P, hold time)   Step ups 6\" step x15 Bilat Fwd (N)  Seated HS stretch 3x30\" Bilat (N)  Supine TrA contraction 10 x 5\"   Supine March w/TrA 2x10    LTR w/TrA 10 x 5\" holds     SKTC 10 x 5\" holds   DKTC 10 x 5\" holds (P, reps)   SLR x10 Bilat (N)  Standing QL stretch x30\" bilat (N)    Manual: Not today: " 4/24/24  STW to B/L QL, paraspinals    OP EDUCATION:       Goals:  Active       PT Problem       PT Goal 1       Start:  04/10/24    Expected End:  05/30/24       Patient will be independent with HEP to improve strength, flexibility, and to reduce pain...   4 weeks    Patient will demonstrate good body mechanics and back safety with floor to waist lift, front carry, overhead lift...  4 weeks    Patient will report 50% less episodes (frequency and duration ) of tingling in R thigh...  4 weeks    Patient will have CONOR score of 10% or less...  4 weeks

## 2024-04-30 ENCOUNTER — TREATMENT (OUTPATIENT)
Dept: PHYSICAL THERAPY | Facility: CLINIC | Age: 74
End: 2024-04-30
Payer: MEDICARE

## 2024-04-30 ENCOUNTER — HOSPITAL ENCOUNTER (OUTPATIENT)
Dept: CARDIOLOGY | Facility: CLINIC | Age: 74
Discharge: HOME | End: 2024-04-30
Payer: MEDICARE

## 2024-04-30 DIAGNOSIS — Z95.0 PACEMAKER, ARTIFICIAL: ICD-10-CM

## 2024-04-30 DIAGNOSIS — I44.2 ATRIOVENTRICULAR BLOCK, COMPLETE (MULTI): ICD-10-CM

## 2024-04-30 DIAGNOSIS — G89.29 CHRONIC BILATERAL LOW BACK PAIN WITHOUT SCIATICA: ICD-10-CM

## 2024-04-30 DIAGNOSIS — M54.50 CHRONIC BILATERAL LOW BACK PAIN WITHOUT SCIATICA: ICD-10-CM

## 2024-04-30 PROCEDURE — 97110 THERAPEUTIC EXERCISES: CPT | Mod: GP | Performed by: PHYSICAL THERAPIST

## 2024-04-30 ASSESSMENT — PAIN - FUNCTIONAL ASSESSMENT: PAIN_FUNCTIONAL_ASSESSMENT: 0-10

## 2024-04-30 NOTE — PROGRESS NOTES
"Physical Therapy Treatment    Patient Name: Melecio Rivera  MRN: [id number]  Today's Date: 4/30/2024  Time Calculation  Start Time: 1345  Stop Time: 1425  Time Calculation (min): 40 min      PT Therapeutic Procedures Time Entry  Therapeutic Exercise Time Entry: 39                         General  Reason for Referral: back pain  Referred By: Mark Anthony  General Comment: 4/5    Assessment:   Pt had good overall tolerance to exercises performed in treatment today.  Pt was challenged with ex's performed.  Good form with all ex's performed with minimal cueing needed.     Plan:  OP PT Plan  Treatment/Interventions: Dry needling, Education/ Instruction, Electrical stimulation, Manual therapy, Neuromuscular re-education, Self care/ home management, Therapeutic exercises (may include IASTM, cupping, dry needle back /lumbar area as needed)  PT Plan: Skilled PT  PT Frequency: 2 times per week  Duration: 4 weeks  Onset Date: 04/10/23  Certification Period Start Date:  (need auth)  Rehab Potential: Good    Continue to work on improving strength and decreasing pain to be able to get a full nights sleep uninterrupted.     Current Problem  Problem List Items Addressed This Visit             ICD-10-CM       Musculoskeletal and Injuries    Chronic bilateral low back pain without sciatica M54.50, G89.29       Subjective  Pt reports that his LBP comes and goes.  Pt reports pain is more mild currently but that the pain gets worse with more he has to stand.  Pt reports that he has significant b/l knee pain worse on the R knee that he feels makes his back pain worse.      Precautions  Precautions  Medical Precautions: No known precautions/limitation    Pain  Pain Assessment: 0-10  Pain Type: Chronic pain  Pain Location: Back  Pain Orientation: Lower      Treatments:  Therapeutic Exercise: 39 minutes, 3 units  SciFit x6'   Slant board 2 x 1'   Step ups 6\" step x15 Bilat Fwd   Seated HS stretch 3x30\" Bilat   Supine TrA contraction 10 x 5\"   Supine " "March w/TrA 2x10    SLR x10 Bilat  LTR w/TrA 10 x 5\" holds   SKTC 10 x 5\" holds   DKTC 10 x 5\" holds    Standing QL stretch x30\" bilat X     Manual:  Not today 4/24/24  STW to B/L QL, paraspinals    OP EDUCATION:   Edu on proper form and speed of movement with ex's.  Pt verbalized/demonstrated good understanding.      Goals:  Active       PT Problem       PT Goal 1       Start:  04/10/24    Expected End:  05/30/24       Patient will be independent with HEP to improve strength, flexibility, and to reduce pain...   4 weeks    Patient will demonstrate good body mechanics and back safety with floor to waist lift, front carry, overhead lift...  4 weeks    Patient will report 50% less episodes (frequency and duration ) of tingling in R thigh...  4 weeks    Patient will have CONOR score of 10% or less...  4 weeks            "

## 2024-05-02 ENCOUNTER — TREATMENT (OUTPATIENT)
Dept: PHYSICAL THERAPY | Facility: CLINIC | Age: 74
End: 2024-05-02
Payer: MEDICARE

## 2024-05-02 DIAGNOSIS — G89.29 CHRONIC BILATERAL LOW BACK PAIN WITHOUT SCIATICA: ICD-10-CM

## 2024-05-02 DIAGNOSIS — M54.50 CHRONIC BILATERAL LOW BACK PAIN WITHOUT SCIATICA: ICD-10-CM

## 2024-05-02 PROCEDURE — 97110 THERAPEUTIC EXERCISES: CPT | Mod: GP | Performed by: PHYSICAL THERAPIST

## 2024-05-02 ASSESSMENT — PAIN - FUNCTIONAL ASSESSMENT: PAIN_FUNCTIONAL_ASSESSMENT: 0-10

## 2024-05-02 ASSESSMENT — PAIN SCALES - GENERAL: PAINLEVEL_OUTOF10: 2

## 2024-05-02 NOTE — PROGRESS NOTES
Physical Therapy Discharge/Treatment    Patient Name: Melecio Rivera  MRN: [id number]  Today's Date: 5/2/2024  Time Calculation  Start Time: 1330  Stop Time: 1357  Time Calculation (min): 27 min      PT Therapeutic Procedures Time Entry  Therapeutic Exercise Time Entry: 25                         General  Reason for Referral: back pain  Referred By: Mark Anthony  General Comment: Visit # 5/5    Assessment:   The pt has made good objective progress in PT with improved lumbar/core/LE strength, ROM/flexibility.  The pt has MET or PARTIALLY MET most of his PT goals and is I with current HEP.  DC PT at this time.  Follow up with MD as needed.      Plan:  OP PT Plan  Treatment/Interventions: Dry needling, Education/ Instruction, Electrical stimulation, Manual therapy, Neuromuscular re-education, Self care/ home management, Therapeutic exercises (may include IASTM, cupping, dry needle back /lumbar area as needed)  PT Plan: Skilled PT  PT Frequency: 2 times per week  Duration: 4 weeks  Onset Date: 04/10/23  Certification Period Start Date:  (need auth)  Rehab Potential: Good    5/2/2024 PT DC Summary:    The pt has made good objective progress in PT with improved lumbar/core/LE strength, ROM/flexibility.  The pt has MET or PARTIALLY MET most of his PT goals and is I with current HEP.  DC PT at this time.  Follow up with MD as needed.        Current Problem  Problem List Items Addressed This Visit             ICD-10-CM       Musculoskeletal and Injuries    Chronic bilateral low back pain without sciatica M54.50, G89.29       Subjective  Pt reports that his low back is doing better overall but that he still has some pain if he overdoes it with activity.  Pt reports that he feels good about DC today and will continue with his ex's as able moving forward.      Precautions  Precautions  Medical Precautions: No known precautions/limitation    Pain  Pain Assessment: 0-10  Pain Score: 2  Pain Type: Chronic pain  Pain Location: Back  Pain  "Orientation: Lower      Treatments:  Therapeutic Exercise: 25 minutes, 2 units  SciFit x6'   Slant board 2 x 1'   Step ups 6\" step x15 Bilat Fwd   Seated HS stretch 3x30\" Bilat   5/2/2024 PT DC Summary Completed.      Following Ex's X Today:  Supine TrA contraction 10 x 5\"   Supine March w/TrA 2x10    SLR x10 Bilat  LTR w/TrA 10 x 5\" holds   SKTC 10 x 5\" holds   DKTC 10 x 5\" holds    Standing QL stretch x30\" bilat X     Manual:  Not today 4/24/24  STW to B/L QL, paraspinals    OP EDUCATION:   Edu on proper form and speed of movement with ex's.  Pt verbalized/demonstrated good understanding.      Goals:  Active       PT Problem       PT Goal 1       Start:  04/10/24    Expected End:  05/30/24       Patient will be independent with HEP to improve strength, flexibility, and to reduce pain...   4 weeks  5/2/2024, MET, pt is I with HEP and has handouts as a reference.     Patient will demonstrate good body mechanics and back safety with floor to waist lift, front carry, overhead lift...  4 weeks  5/2/2024, PARTIALLY MET, pt reports that he does his best to use safe body mechanics but that he is not always intentional about it.      Patient will report 50% less episodes (frequency and duration ) of tingling in R thigh...  4 weeks  5/2/2024, PARTIALLY MET, pt reports 25% less occurrence of R thigh numbness/tingling    Patient will have CONOR score of 10% or less...  4 weeks  5/2/2024, NOT MET, pt scored a 13% at baseline and a 20% at DC            "

## 2024-07-14 DIAGNOSIS — E78.5 HYPERLIPIDEMIA, UNSPECIFIED HYPERLIPIDEMIA TYPE: ICD-10-CM

## 2024-07-15 RX ORDER — ATORVASTATIN CALCIUM 20 MG/1
20 TABLET, FILM COATED ORAL DAILY
Qty: 90 TABLET | Refills: 1 | Status: SHIPPED | OUTPATIENT
Start: 2024-07-15

## 2024-07-30 ENCOUNTER — HOSPITAL ENCOUNTER (OUTPATIENT)
Dept: CARDIOLOGY | Facility: CLINIC | Age: 74
Discharge: HOME | End: 2024-07-30
Payer: MEDICARE

## 2024-07-30 DIAGNOSIS — Z95.0 PACEMAKER, ARTIFICIAL: ICD-10-CM

## 2024-07-30 DIAGNOSIS — I44.2 ATRIOVENTRICULAR BLOCK, COMPLETE (MULTI): ICD-10-CM

## 2024-07-30 PROCEDURE — 93296 REM INTERROG EVL PM/IDS: CPT

## 2024-08-01 DIAGNOSIS — I71.21 ANEURYSM OF ASCENDING AORTA WITHOUT RUPTURE (CMS-HCC): ICD-10-CM

## 2024-08-05 ENCOUNTER — TELEPHONE (OUTPATIENT)
Dept: CARDIOLOGY | Facility: CLINIC | Age: 74
End: 2024-08-05
Payer: MEDICARE

## 2024-08-05 DIAGNOSIS — I71.20 THORACIC AORTIC ANEURYSM WITHOUT RUPTURE, UNSPECIFIED PART (CMS-HCC): ICD-10-CM

## 2024-08-05 NOTE — TELEPHONE ENCOUNTER
"Secure message from Julissa     \"Patient to be seen on Wednesday and was supposed to have testing on 8/1, but no showed. Can we reschedule testing and push out appointment? \"    LM for pt to call office back.      "

## 2024-08-07 ENCOUNTER — APPOINTMENT (OUTPATIENT)
Dept: CARDIOLOGY | Facility: CLINIC | Age: 74
End: 2024-08-07
Payer: MEDICARE

## 2024-08-08 DIAGNOSIS — I71.20 THORACIC AORTIC ANEURYSM WITHOUT RUPTURE, UNSPECIFIED PART (CMS-HCC): Primary | ICD-10-CM

## 2024-08-12 ENCOUNTER — LAB (OUTPATIENT)
Dept: LAB | Facility: LAB | Age: 74
End: 2024-08-12
Payer: MEDICARE

## 2024-08-12 ENCOUNTER — HOSPITAL ENCOUNTER (OUTPATIENT)
Dept: RADIOLOGY | Facility: HOSPITAL | Age: 74
Discharge: HOME | End: 2024-08-12
Payer: MEDICARE

## 2024-08-12 DIAGNOSIS — I71.20 THORACIC AORTIC ANEURYSM WITHOUT RUPTURE, UNSPECIFIED PART (CMS-HCC): ICD-10-CM

## 2024-08-12 DIAGNOSIS — I71.21 ANEURYSM OF ASCENDING AORTA WITHOUT RUPTURE (CMS-HCC): ICD-10-CM

## 2024-08-12 LAB
ANION GAP SERPL CALC-SCNC: 10 MMOL/L (ref 10–20)
BUN SERPL-MCNC: 21 MG/DL (ref 6–23)
CALCIUM SERPL-MCNC: 9.2 MG/DL (ref 8.6–10.3)
CHLORIDE SERPL-SCNC: 108 MMOL/L (ref 98–107)
CO2 SERPL-SCNC: 26 MMOL/L (ref 21–32)
CREAT SERPL-MCNC: 1.48 MG/DL (ref 0.5–1.3)
EGFRCR SERPLBLD CKD-EPI 2021: 49 ML/MIN/1.73M*2
GLUCOSE SERPL-MCNC: 106 MG/DL (ref 74–99)
POTASSIUM SERPL-SCNC: 4.3 MMOL/L (ref 3.5–5.3)
SODIUM SERPL-SCNC: 140 MMOL/L (ref 136–145)

## 2024-08-12 PROCEDURE — 36415 COLL VENOUS BLD VENIPUNCTURE: CPT

## 2024-08-12 PROCEDURE — 80048 BASIC METABOLIC PNL TOTAL CA: CPT

## 2024-08-12 PROCEDURE — 71275 CT ANGIOGRAPHY CHEST: CPT | Performed by: INTERNAL MEDICINE

## 2024-08-12 PROCEDURE — 71275 CT ANGIOGRAPHY CHEST: CPT

## 2024-08-12 PROCEDURE — 2550000001 HC RX 255 CONTRASTS

## 2024-08-16 NOTE — PROGRESS NOTES
CHIEF COMPLAINT  Follow up testing     HISTORY OF PRESENT ILLNESS    Cardiovascular hx:    TAA  -Recent CTA of chest showing stable fusiform aneurysmal dilatation of the ascending aorta measuring 4.1 cm   -Current medical therapy includes ASA 81mg and high-intensity statin  -BP is stable; patient does check at home and reports systolic between 130-140; current medical therapy includes lisinopril 40mg daily and chlorthalidone 25mg daily, amlodipine 10mg daily   -Patient smokes approximately 2 ppd with no intentions of quitting; CT of chest showing stable 3mm lung nodule     2. PAD:  -Hx of bilateral SFA intervention for claudication  -Current medical regimen includes ASA 81mg and high-intensity statin    3. Carotid artery stenosis, right, asymptomatic:  -Carotid duplex (October, 2021)-showing <50% stenosis bilaterally   -Current medical regimen includes ASA 81mg and statin          Past Medical, Surgical, and Family History reviewed and updated in chart.     Reviewed all medications by prescribing practitioner or clinical pharmacist (such as prescriptions, OTCs, herbal therapies and supplements) and documented in the medical record.    Past Medical History  Past Medical History:   Diagnosis Date    Abnormal findings on diagnostic imaging of other specified body structures     Abnormal CT of the chest    Abnormal findings on diagnostic imaging of other specified body structures     Abnormal CT of the chest    Complete AV block (Multi) 03/21/2023    LAFB (left anterior fascicular block) 03/21/2023    Low vitamin B12 level 03/21/2023    Personal history of other diseases of the circulatory system 02/11/2022    History of complete atrioventricular block    Personal history of other medical treatment     History of echocardiogram    Right bundle branch block 03/21/2023       Social History  Social History     Tobacco Use    Smoking status: Every Day     Types: Cigarettes    Smokeless tobacco: Never   Substance Use  Topics    Alcohol use: Not Currently    Drug use: Never       Family History     Family History   Problem Relation Name Age of Onset    Cancer Mother      Alzheimer's disease Father      Cancer Sister          Allergies:  No Known Allergies     Outpatient Medications:  Current Outpatient Medications   Medication Instructions    albuterol 90 mcg/actuation inhaler INHALE 2 PUFFS BY MOUTH 4 TIMES DAILY AS NEEDED FOR WHEEZING    amLODIPine (NORVASC) 10 mg, oral, Daily    aspirin 81 mg, oral, Daily    atorvastatin (LIPITOR) 20 mg, oral, Daily    chlorthalidone (HYGROTON) 25 mg, oral, Daily    cyanocobalamin (VITAMIN B-12) 1,000 mcg, oral, Daily    diphenhydrAMINE (BENADRYL) 50 mg, oral, Nightly PRN    guaiFENesin (MUCINEX) 600 mg, oral, 2 times daily PRN    lisinopril 40 mg, oral, Daily, as directed    mirtazapine (Remeron) 15 mg tablet TAKE 1 TAB(S) ORAL ONCE A DAY (AT BEDTIME)    tiotropium (Spiriva with HandiHaler) 18 mcg inhalation capsule INHALE 1 CAPSULE VIA HANDIHALER ONCE DAILY AT THE SAME TIME EVERY DAY          Labs:   CMP:  Recent Labs     08/12/24  0919 02/15/24  1222 03/30/23  0935 03/11/22  1155 07/06/21  1116 03/12/20  0530 03/11/20  0535    139 138 141 138   < > 140   K 4.3 4.3 4.3 4.4 4.3   < > 3.9   * 105 104 108* 105   < > 110*   CO2 26 28 27 26 28   < > 22   ANIONGAP 10 10 11 11 9*   < > 12   BUN 21 35* 23 23 15   < > 19   CREATININE 1.48* 1.49* 1.24 1.08 1.03   < > 1.08   EGFR 49* 49*  --   --   --   --   --    MG  --   --   --   --   --   --  2.22    < > = values in this interval not displayed.     Recent Labs     02/15/24  1222 03/30/23  0935 07/06/21  1116 07/13/20  0904 03/11/20  0535   ALBUMIN 4.1 4.3 4.5 4.5 3.8   ALKPHOS 80 76 99 94 75   ALT 19 24 29 29 17   AST 17 19 23 24 14   BILITOT 0.6 0.5 0.7 0.7 0.6     CBC:  Recent Labs     02/15/24  1222 03/30/23  0935 03/11/22  1155 07/06/21  1116 07/13/20  0904   WBC 10.5 10.7 7.1 8.4 8.4   HGB 14.8 14.1 14.5 16.1 15.5   HCT 45.1 43.6  "42.6 48.0 46.6    201 178 185 168   MCV 94 95 91 94 93     COAG:   Recent Labs     03/10/20  1236   INR 1.0     ABO: No results for input(s): \"ABO\" in the last 15882 hours.  HEME/ENDO:  Recent Labs     02/15/24  1222 03/30/23  0935 07/06/21  1116 07/13/20  0904   TSH 0.65 0.59 1.59 1.10      CARDIAC: No results for input(s): \"LDH\", \"CKMB\", \"TROPHS\", \"BNP\" in the last 76957 hours.    No lab exists for component: \"CK\", \"CKMBP\"  Recent Labs     02/15/24  1222 03/30/23  0935 07/06/21  1116 07/13/20  0904   CHOL 117 128 133 141   LDLF  --  67 70 74   HDL 32.0 48.0 51.0 48.0   TRIG 107 65 60 95     MICRO: No results for input(s): \"ESR\", \"CRP\", \"PROCAL\" in the last 28192 hours.  No results found for the last 90 days.    Notable Studies: imaging personally reviewed   EKG:No results found for this or any previous visit (from the past 4464 hour(s)).  Echocardiogram:   Echocardiogram     [facility], [address] [place], [place] [address]Tel [phone] and  Fax [phone]    TRANSTHORACIC ECHOCARDIOGRAM REPORT      Patient Name:     DONTA Duarte Physician:  [id number] Alec Lopez MD  Study Date:       3/11/2020          Referring           Magno Cosme MD  Physician:  MRN/PID:          [id number]           PCP:  Accession/Order#: [id number]          Department          [facility] floor [facility]  Location:           Center  YOB: 1950           Fellow:  Gender:           M                  Nurse:  Admit Date:       3/10/2020          Sonographer:        DAKOTA Valerio RDCS  Admission Status: Inpatient -        Additional Staff:  Priority discharge  Height:           177.80 cm          CC Report to:       [facility] 1st floor [facility]  Center [facility]  Weight:           87.09 kg           Study Type:         Echocardiogram  BSA:              2.05 m2  Blood Pressure: 142 /69 mmHg    Diagnosis/ICD: R00.1-Bradycardia, unspecified  Indication:    Abnormal EKG  Procedure/CPT: Echo " Complete w Full Doppler-[id number]    Patient History:  Pertinent         HTN, Hyperlipidemia and Syncope. Bradycardia, PAD, PVD, AV  History:          block.    Study Detail: The following Echo studies were performed: 2D, M-Mode, Doppler and  color flow. Technically challenging study due to poor acoustic  windows and patient lying in supine position. Definity used as a  contrast agent for endocardial border definition. Total contrast  used for this procedure was 2 mL via IV push. Patient has a  pacemaker.      PHYSICIAN INTERPRETATION:  Left Ventricle: The left ventricular systolic function is normal, with an estimated ejection fraction of 55-60%. There are no regional wall motion abnormalities. The left ventricular cavity size is normal. Spectral Doppler shows an impaired relaxation pattern of left ventricular diastolic filling.  Left Atrium: The left atrium is normal in size.  Right Ventricle: The right ventricle is normal in size. There is normal right ventricular global systolic function. A device is visualized in the right ventricle.  Right Atrium: The right atrium is normal in size. There is a device visualized in the right atrium.  Aortic Valve: The aortic valve is trileaflet. There is trivial aortic valve regurgitation. The peak instantaneous gradient of the aortic valve is 6.4 mmHg.  Mitral Valve: The mitral valve is normal in structure. There is trace mitral valve regurgitation.  Tricuspid Valve: The tricuspid valve is structurally normal. There is trace tricuspid regurgitation.  Pulmonic Valve: The pulmonic valve is structurally normal. There is trace pulmonic valve regurgitation.  Pericardium: There is no pericardial effusion noted.  Aorta: The aortic root is normal.  Systemic Veins: The inferior vena cava appears to be of normal size. There is IVC inspiratory collapse greater than 50%.  In comparison to the previous echocardiogram(s): There are no prior studies on this patient for comparison  purposes.      CONCLUSIONS:  1. The left ventricular systolic function is normal with a 55-60% estimated ejection fraction.  2. Spectral Doppler shows an impaired relaxation pattern of left ventricular diastolic filling.    QUANTITATIVE DATA SUMMARY:  2D MEASUREMENTS:  Normal Ranges:  LAs:           3.67 cm   (2.7-4.0cm)  IVSd:          1.05 cm   (0.6-1.1cm)  LVPWd:         0.75 cm   (0.6-1.1cm)  LVIDd:         4.44 cm   (3.9-5.9cm)  LVIDs:         2.50 cm  LV Mass Index: 63.1 g/m2  LV % FS        43.7 %    LA VOLUME:  Normal Ranges:  LA Vol A4C:        52.0 ml    (22+/-6mL/m2)  LA Vol A2C:        48.2 ml  LA Vol BP:         51.0 ml  LA Vol Index A4C:  25.3 ml/m2  LA Vol Index A2C:  23.5 ml/m2  LA Vol Index BP:   24.9 ml/m2  LA Area A4C:       18.0 cm2  LA Area A2C:       17.0 cm2  LA Major Axis A4C: 5.3 cm  LA Major Axis A2C: 5.1 cm  LA Volume Index:   23.0 ml/m2    RA VOLUME BY A/L METHOD:  Normal Ranges:  RA Vol A4C:        32.6 ml    (8.3-19.5ml)  RA Vol Index A4C:  15.9 ml/m2  RA Area A4C:       13.0 cm2  RA Major Axis A4C: 4.4 cm    M-MODE MEASUREMENTS:  Normal Ranges:  Ao Root: 3.30 cm (2.0-3.7cm)  LAs:     4.01 cm (2.7-4.0cm)    AORTA MEASUREMENTS:  Normal Ranges:  Asc Ao, d: 3.80 cm (2.1-3.4cm)    LV SYSTOLIC FUNCTION BY 2D PLANIMETRY (MOD):  Normal Ranges:  EF-A4C View: 52.6 % (>55%)  EF-A2C View: 63.2 %  EF-Biplane:  59.1 %    AORTIC VALVE:  Normal Ranges:  AoV Vmax:      1.27 m/s (<1.7m/s)  AoV Peak P.4 mmHg (<20mmHg)  LVOT Max Leroy:  1.05 m/s (<1.1m/s)  LVOT VTI:      17.58 cm  LVOT Diameter: 2.06 cm  (1.8-2.4cm)  AoV Area,Vmax: 2.75 cm2 (2.5-4.5cm2)    RIGHT VENTRICLE:  RV 1   2.9 cm  TAPSE: 25.0 mm  RV s'  0.14 m/s    TRICUSPID VALVE/RVSP:  Normal Ranges:  IVC Diam: 1.50 cm    PULMONIC VALVE:  Normal Ranges:  PV Max Leroy: 0.8 m/s  (0.6-0.9m/s)  PV Max P.7 mmHg    AORTA:  Asc Ao Diam 3.82 cm      [id number] Alec Lopez MD  Electronically signed on 3/11/2020 at 4:38:50 PM      Stress  Testing: No results found for this or any previous visit from the past 1825 days.    Cardiac Catheterization: No results found for this or any previous visit from the past 1825 days.  No results found for this or any previous visit from the past 3650 days.         REVIEW OF SYSTEMS  A 10-point system review was completed and was negative except as noted in the HPI.      VITALS  Vitals:    08/21/24 0908   BP: 142/70   Pulse: 60   SpO2: 98%       PHYSICAL EXAM  General: awake, alert and oriented. No acute distress.   Skin: Skin is warm, dry and intact without rashes or lesions.  HEENT: normocephalic, atraumatic; conjunctivae are clear without exudates or hemorrhage. Sclera is non-icteric. Eyelids are normal in appearance without swelling or lesions. Hearing intact. Nares are patent bilaterally. Moist mucous membranes.   Cardiovascular: heart rate and rhythm are normal. No murmurs, gallops, or rubs are auscultated. S1 and S2 are heard and are of normal intensity. No JVD, no carotid bruits  Respiratory: diminished to bases  Gastrointestinal: non-distended, non-tender  Genitourinary: exam deferred  Musculoskeletal: ROM intact, no deformities  Extremities: pulses palpable bilaterally; no swelling or erythema  Neurological: no focal deficits; gait steady  Psychiatric: appropriate mood and affect; good judgment and insight          ASSESSMENT AND PLAN  Assessment/Plan   Diagnoses and all orders for this visit:  Aneurysm of ascending aorta without rupture (CMS-HCC)  Smoker  Benign hypertension  -Reviewed recent CTA of chest showing stable 4.1 cm TAA; will repeat imaging in 1 year  -BP is stable; continue anti-hypertensive regimen with no changes at this time         -Unfortunately, patient continues to smoke with no intentions of quitting; will inform PCP of imaging showing a stable 3mm lung nodule         -Continue ASA and statin   Carotid artery stenosis, asymptomatic, bilateral      -Continue ASA and statin         RTC: 1  year      Thank you for allowing me to participate in the care of this patient. Please reach me out if you have any questions or if you need any clarifications regarding the patient's care.    Julissa Foster DNP, APRN, FNP-C  Division of Cardiovascular Medicine  [facility]  [facility]

## 2024-08-21 ENCOUNTER — APPOINTMENT (OUTPATIENT)
Dept: CARDIOLOGY | Facility: CLINIC | Age: 74
End: 2024-08-21
Payer: MEDICARE

## 2024-08-21 VITALS
BODY MASS INDEX: 29.69 KG/M2 | HEIGHT: 70 IN | DIASTOLIC BLOOD PRESSURE: 70 MMHG | HEART RATE: 60 BPM | OXYGEN SATURATION: 98 % | SYSTOLIC BLOOD PRESSURE: 142 MMHG | WEIGHT: 207.4 LBS

## 2024-08-21 DIAGNOSIS — I10 BENIGN HYPERTENSION: ICD-10-CM

## 2024-08-21 DIAGNOSIS — I71.21 ANEURYSM OF ASCENDING AORTA WITHOUT RUPTURE (CMS-HCC): Primary | ICD-10-CM

## 2024-08-21 DIAGNOSIS — I65.23 CAROTID ARTERY STENOSIS, ASYMPTOMATIC, BILATERAL: ICD-10-CM

## 2024-08-21 DIAGNOSIS — F17.200 SMOKER: ICD-10-CM

## 2024-08-21 PROCEDURE — 3078F DIAST BP <80 MM HG: CPT

## 2024-08-21 PROCEDURE — 1159F MED LIST DOCD IN RCRD: CPT

## 2024-08-21 PROCEDURE — 1160F RVW MEDS BY RX/DR IN RCRD: CPT

## 2024-08-21 PROCEDURE — 99214 OFFICE O/P EST MOD 30 MIN: CPT

## 2024-08-21 PROCEDURE — 3077F SYST BP >= 140 MM HG: CPT

## 2024-08-21 RX ORDER — GUAIFENESIN 600 MG/1
600 TABLET, EXTENDED RELEASE ORAL 2 TIMES DAILY PRN
Qty: 60 TABLET | Refills: 3 | Status: SHIPPED | OUTPATIENT
Start: 2024-08-21

## 2024-09-12 ENCOUNTER — APPOINTMENT (OUTPATIENT)
Dept: PRIMARY CARE | Facility: CLINIC | Age: 74
End: 2024-09-12
Payer: MEDICARE

## 2024-09-12 VITALS
SYSTOLIC BLOOD PRESSURE: 110 MMHG | HEART RATE: 68 BPM | WEIGHT: 208 LBS | BODY MASS INDEX: 29.78 KG/M2 | HEIGHT: 70 IN | DIASTOLIC BLOOD PRESSURE: 62 MMHG

## 2024-09-12 DIAGNOSIS — F33.2 ENDOGENOUS DEPRESSION (MULTI): ICD-10-CM

## 2024-09-12 DIAGNOSIS — J43.8 OTHER EMPHYSEMA (MULTI): ICD-10-CM

## 2024-09-12 DIAGNOSIS — I10 BENIGN HYPERTENSION: ICD-10-CM

## 2024-09-12 DIAGNOSIS — E78.49 OTHER HYPERLIPIDEMIA: ICD-10-CM

## 2024-09-12 DIAGNOSIS — F17.200 SMOKER: ICD-10-CM

## 2024-09-12 DIAGNOSIS — Z23 NEED FOR INFLUENZA VACCINATION: Primary | ICD-10-CM

## 2024-09-12 DIAGNOSIS — R73.09 ELEVATED GLUCOSE LEVEL: ICD-10-CM

## 2024-09-12 PROBLEM — M54.50 CHRONIC BILATERAL LOW BACK PAIN WITHOUT SCIATICA: Status: RESOLVED | Noted: 2024-04-10 | Resolved: 2024-09-12

## 2024-09-12 PROBLEM — R91.1 LUNG NODULE: Status: RESOLVED | Noted: 2023-03-21 | Resolved: 2024-09-12

## 2024-09-12 PROBLEM — G89.29 CHRONIC BILATERAL LOW BACK PAIN WITHOUT SCIATICA: Status: RESOLVED | Noted: 2024-04-10 | Resolved: 2024-09-12

## 2024-09-12 PROCEDURE — 1160F RVW MEDS BY RX/DR IN RCRD: CPT | Performed by: FAMILY MEDICINE

## 2024-09-12 PROCEDURE — 90673 RIV3 VACCINE NO PRESERV IM: CPT | Performed by: FAMILY MEDICINE

## 2024-09-12 PROCEDURE — G2211 COMPLEX E/M VISIT ADD ON: HCPCS | Performed by: FAMILY MEDICINE

## 2024-09-12 PROCEDURE — 3074F SYST BP LT 130 MM HG: CPT | Performed by: FAMILY MEDICINE

## 2024-09-12 PROCEDURE — 1159F MED LIST DOCD IN RCRD: CPT | Performed by: FAMILY MEDICINE

## 2024-09-12 PROCEDURE — 99214 OFFICE O/P EST MOD 30 MIN: CPT | Performed by: FAMILY MEDICINE

## 2024-09-12 PROCEDURE — 3078F DIAST BP <80 MM HG: CPT | Performed by: FAMILY MEDICINE

## 2024-09-12 PROCEDURE — G0008 ADMIN INFLUENZA VIRUS VAC: HCPCS | Performed by: FAMILY MEDICINE

## 2024-09-12 PROCEDURE — 4004F PT TOBACCO SCREEN RCVD TLK: CPT | Performed by: FAMILY MEDICINE

## 2024-09-12 NOTE — PROGRESS NOTES
Patient presents for periodic surveillance of chronic medical problems.     Subjective  Melecio Rivera is a 74 y.o. male who presents for Annual Exam.  HPI  HTN, hyperlipdemia, copd, stable  Depression stable Smoking cessation advised.  Review of Systems   All other systems reviewed and are negative.  .    No Known Allergies    Current Outpatient Medications on File Prior to Visit   Medication Sig Dispense Refill    albuterol 90 mcg/actuation inhaler INHALE 2 PUFFS BY MOUTH 4 TIMES DAILY AS NEEDED FOR WHEEZING      amLODIPine (Norvasc) 10 mg tablet TAKE 1 TABLET BY MOUTH EVERY DAY 90 tablet 1    aspirin 81 mg EC tablet Take 1 tablet (81 mg) by mouth once daily.      atorvastatin (Lipitor) 20 mg tablet TAKE 1 TABLET BY MOUTH EVERY DAY 90 tablet 1    chlorthalidone (Hygroton) 25 mg tablet TAKE 1 TABLET BY MOUTH EVERY DAY 90 tablet 1    cyanocobalamin (Vitamin B-12) 1,000 mcg tablet Take 1 tablet (1,000 mcg) by mouth once daily.      diphenhydrAMINE (BENADryl) 50 mg tablet Take 1 tablet (50 mg) by mouth as needed at bedtime for sleep.      guaiFENesin (Mucinex) 600 mg 12 hr tablet Take 1 tablet (600 mg) by mouth 2 times a day as needed for cough. 60 tablet 3    lisinopril 40 mg tablet Take 1 tablet (40 mg) by mouth once daily. as directed 90 tablet 1    mirtazapine (Remeron) 15 mg tablet TAKE 1 TAB(S) ORAL ONCE A DAY (AT BEDTIME) 90 tablet 1    TURMERIC ORAL Take by mouth.      tiotropium (Spiriva with HandiHaler) 18 mcg inhalation capsule INHALE 1 CAPSULE VIA HANDIHALER ONCE DAILY AT THE SAME TIME EVERY DAY       No current facility-administered medications on file prior to visit.       Patient Active Problem List   Diagnosis    Benign hypertension    Carotid artery stenosis, asymptomatic, bilateral    Emphysema/COPD (Multi)    Endogenous depression (Multi)    Hyperlipidemia    Pacemaker, artificial    PVD (peripheral vascular disease) (CMS-HCC)    Thoracic aortic aneurysm without rupture (CMS-HCC)    Tubular adenoma  of colon    Smoker    Stage 3a chronic kidney disease (Multi)       Objective     Visit Vitals  /62 (BP Location: Left arm, Patient Position: Sitting)   Pulse 68      Physical Exam  Vitals and nursing note reviewed.   Constitutional:       General: He is not in acute distress.     Appearance: Normal appearance. He is not toxic-appearing.   HENT:      Head: Normocephalic and atraumatic.   Cardiovascular:      Rate and Rhythm: Normal rate and regular rhythm.      Heart sounds: No murmur heard.  Pulmonary:      Effort: Pulmonary effort is normal.      Breath sounds: Normal breath sounds.   Musculoskeletal:      Cervical back: Neck supple. No rigidity.      Comments:     Skin:     General: Skin is warm and dry.   Neurological:      General: No focal deficit present.      Mental Status: He is alert and oriented to person, place, and time.   Psychiatric:         Mood and Affect: Mood normal.         Behavior: Behavior normal.         Assessment/Plan   Problem List Items Addressed This Visit       Benign hypertension    Emphysema/COPD (Multi)    Endogenous depression (Multi)    Hyperlipidemia    Relevant Orders    CBC and Auto Differential    Comprehensive Metabolic Panel    Lipid Panel    TSH with reflex to Free T4 if abnormal    Vitamin B12    Hemoglobin A1C    Smoker     Other Visit Diagnoses       Need for influenza vaccination    -  Primary    Relevant Orders    Flu vaccine, trivalent, preservative free, no egg protein, age 18y+ (Flublok) (Completed)    Elevated glucose level        Relevant Orders    Hemoglobin A1C             Follow up six months, labs prior.       Flory Hopson MD

## 2024-09-17 ENCOUNTER — CLINICAL SUPPORT (OUTPATIENT)
Age: 74
End: 2024-09-17
Payer: MEDICARE

## 2024-09-17 DIAGNOSIS — Z12.11 SCREENING FOR COLON CANCER: ICD-10-CM

## 2024-09-17 LAB — POC FECAL OCCULT BLOOD IMMUNOASSAY: NEGATIVE

## 2024-09-17 PROCEDURE — 82274 ASSAY TEST FOR BLOOD FECAL: CPT | Performed by: FAMILY MEDICINE

## 2024-10-29 ENCOUNTER — HOSPITAL ENCOUNTER (OUTPATIENT)
Dept: CARDIOLOGY | Facility: CLINIC | Age: 74
Discharge: HOME | End: 2024-10-29
Payer: MEDICARE

## 2024-10-29 DIAGNOSIS — Z95.0 PACEMAKER, ARTIFICIAL: ICD-10-CM

## 2024-10-29 DIAGNOSIS — I44.2 ATRIOVENTRICULAR BLOCK, COMPLETE (MULTI): ICD-10-CM

## 2024-10-29 PROCEDURE — 93296 REM INTERROG EVL PM/IDS: CPT

## 2024-11-09 DIAGNOSIS — I10 HYPERTENSION, UNSPECIFIED TYPE: ICD-10-CM

## 2024-11-09 DIAGNOSIS — I10 BENIGN HYPERTENSION: ICD-10-CM

## 2024-11-11 RX ORDER — CHLORTHALIDONE 25 MG/1
25 TABLET ORAL DAILY
Qty: 90 TABLET | Refills: 1 | Status: SHIPPED | OUTPATIENT
Start: 2024-11-11

## 2024-11-11 RX ORDER — AMLODIPINE BESYLATE 10 MG/1
10 TABLET ORAL DAILY
Qty: 90 TABLET | Refills: 1 | Status: SHIPPED | OUTPATIENT
Start: 2024-11-11

## 2024-11-11 RX ORDER — LISINOPRIL 40 MG/1
40 TABLET ORAL DAILY
Qty: 90 TABLET | Refills: 1 | Status: SHIPPED | OUTPATIENT
Start: 2024-11-11

## 2024-11-26 ENCOUNTER — OFFICE VISIT (OUTPATIENT)
Age: 74
End: 2024-11-26
Payer: MEDICARE

## 2024-11-26 VITALS
HEART RATE: 64 BPM | BODY MASS INDEX: 29.49 KG/M2 | WEIGHT: 206 LBS | DIASTOLIC BLOOD PRESSURE: 72 MMHG | SYSTOLIC BLOOD PRESSURE: 134 MMHG | HEIGHT: 70 IN

## 2024-11-26 DIAGNOSIS — B37.9 CANDIDA ALBICANS INFECTION: ICD-10-CM

## 2024-11-26 DIAGNOSIS — Z12.11 SCREENING FOR COLON CANCER: Primary | ICD-10-CM

## 2024-11-26 PROCEDURE — G2211 COMPLEX E/M VISIT ADD ON: HCPCS | Performed by: FAMILY MEDICINE

## 2024-11-26 PROCEDURE — 3075F SYST BP GE 130 - 139MM HG: CPT | Performed by: FAMILY MEDICINE

## 2024-11-26 PROCEDURE — 99213 OFFICE O/P EST LOW 20 MIN: CPT | Performed by: FAMILY MEDICINE

## 2024-11-26 PROCEDURE — 4004F PT TOBACCO SCREEN RCVD TLK: CPT | Performed by: FAMILY MEDICINE

## 2024-11-26 PROCEDURE — 1159F MED LIST DOCD IN RCRD: CPT | Performed by: FAMILY MEDICINE

## 2024-11-26 PROCEDURE — 1160F RVW MEDS BY RX/DR IN RCRD: CPT | Performed by: FAMILY MEDICINE

## 2024-11-26 PROCEDURE — 3078F DIAST BP <80 MM HG: CPT | Performed by: FAMILY MEDICINE

## 2024-11-26 RX ORDER — NYSTATIN 100000 U/G
CREAM TOPICAL 2 TIMES DAILY
Qty: 30 G | Refills: 2 | Status: SHIPPED | OUTPATIENT
Start: 2024-11-26 | End: 2024-12-03

## 2024-11-26 RX ORDER — NYSTATIN 100000 [USP'U]/G
1 POWDER TOPICAL 2 TIMES DAILY
Qty: 60 G | Refills: 3 | Status: SHIPPED | OUTPATIENT
Start: 2024-11-26

## 2024-11-26 NOTE — PROGRESS NOTES
Subjective  Melecio Rivera is a 74 y.o. male who presents for Itching.  HPI  Rash in groin, itches, using otc ointments.  Review of Systems   All other systems reviewed and are negative.  .    No Known Allergies    Current Outpatient Medications on File Prior to Visit   Medication Sig Dispense Refill    albuterol 90 mcg/actuation inhaler INHALE 2 PUFFS BY MOUTH 4 TIMES DAILY AS NEEDED FOR WHEEZING      amLODIPine (Norvasc) 10 mg tablet TAKE 1 TABLET BY MOUTH EVERY DAY 90 tablet 1    aspirin 81 mg EC tablet Take 1 tablet (81 mg) by mouth once daily.      atorvastatin (Lipitor) 20 mg tablet TAKE 1 TABLET BY MOUTH EVERY DAY 90 tablet 1    chlorthalidone (Hygroton) 25 mg tablet TAKE 1 TABLET BY MOUTH EVERY DAY 90 tablet 1    cyanocobalamin (Vitamin B-12) 1,000 mcg tablet Take 1 tablet (1,000 mcg) by mouth once daily.      diphenhydrAMINE (BENADryl) 50 mg tablet Take 1 tablet (50 mg) by mouth as needed at bedtime for sleep.      guaiFENesin (Mucinex) 600 mg 12 hr tablet Take 1 tablet (600 mg) by mouth 2 times a day as needed for cough. 60 tablet 3    lisinopril 40 mg tablet TAKE 1 TABLET (40 MG) BY MOUTH ONCE DAILY AS DIRECTED 90 tablet 1    mirtazapine (Remeron) 15 mg tablet TAKE 1 TAB(S) ORAL ONCE A DAY (AT BEDTIME) 90 tablet 1    tiotropium (Spiriva with HandiHaler) 18 mcg inhalation capsule INHALE 1 CAPSULE VIA HANDIHALER ONCE DAILY AT THE SAME TIME EVERY DAY      [DISCONTINUED] TURMERIC ORAL Take by mouth.       No current facility-administered medications on file prior to visit.       Patient Active Problem List   Diagnosis    Benign hypertension    Carotid artery stenosis, asymptomatic, bilateral    Emphysema/COPD (Multi)    Endogenous depression (Multi)    Hyperlipidemia    Pacemaker, artificial    PVD (peripheral vascular disease) (CMS-HCC)    Thoracic aortic aneurysm without rupture (CMS-HCC)    Tubular adenoma of colon    Smoker    Stage 3a chronic kidney disease (Multi)       Objective     Visit  Vitals  /72 (BP Location: Left arm, Patient Position: Sitting)   Pulse 64      Physical Exam  Vitals reviewed.   HENT:      Head: Normocephalic.   Skin:     Comments: Nurse Serene present for exam  Rash in inguinal area bilateral red with satellite lesions clinically c/w candida   Neurological:      Mental Status: He is alert.         Assessment/Plan   Problem List Items Addressed This Visit    None  Visit Diagnoses       Screening for colon cancer    -  Primary    Relevant Orders    Colonoscopy Screening; Average Risk Patient    Candida albicans infection        Relevant Medications    nystatin (Mycostatin) cream    nystatin (Mycostatin) 100,000 unit/gram powder                   Flory Hopson MD

## 2024-12-10 ENCOUNTER — APPOINTMENT (OUTPATIENT)
Dept: SURGERY | Facility: CLINIC | Age: 74
End: 2024-12-10
Payer: MEDICARE

## 2024-12-10 VITALS
DIASTOLIC BLOOD PRESSURE: 62 MMHG | HEART RATE: 66 BPM | WEIGHT: 207 LBS | SYSTOLIC BLOOD PRESSURE: 128 MMHG | BODY MASS INDEX: 29.63 KG/M2 | HEIGHT: 70 IN

## 2024-12-10 DIAGNOSIS — B37.9 MONILIA INFECTION: Primary | ICD-10-CM

## 2024-12-10 PROCEDURE — 3074F SYST BP LT 130 MM HG: CPT | Performed by: SURGERY

## 2024-12-10 PROCEDURE — 3078F DIAST BP <80 MM HG: CPT | Performed by: SURGERY

## 2024-12-10 PROCEDURE — 4004F PT TOBACCO SCREEN RCVD TLK: CPT | Performed by: SURGERY

## 2024-12-10 PROCEDURE — 1159F MED LIST DOCD IN RCRD: CPT | Performed by: SURGERY

## 2024-12-10 PROCEDURE — 99203 OFFICE O/P NEW LOW 30 MIN: CPT | Performed by: SURGERY

## 2024-12-10 RX ORDER — FLUCONAZOLE 150 MG/1
150 TABLET ORAL DAILY
Status: SHIPPED | OUTPATIENT
Start: 2024-12-10 | End: 2024-12-13

## 2024-12-10 NOTE — PROGRESS NOTES
General Surgery Consultation    Patient: Melecio Rivera  : 1950  MRN: [id number]  Date of Consultation: 12/10/24    Primary Care Provider: Flory Hopson MD  Referring Provider: No ref. provider found    Chief Complaint: Colon cancer screening/anal leakage    History of Present Illness: Melecio Rivera is a 74 y.o. old male seen on referral for colon cancer screening as well as to evaluate anal leakage.  Patient's last colonoscopy was in  and he had 1 polyp removed which was a tubular adenoma.  He has had a Cologuard which was negative.  He has a pacemaker.  He then went on to discuss his skin issues which consist of monilial infections in his groin and along his gluteal cleft.  He states it itches terribly and sometimes when he coughs he would lose a little bit of liquid rectally.  Medical History:  Past Medical History:   Diagnosis Date    Abnormal findings on diagnostic imaging of other specified body structures     Abnormal CT of the chest    Abnormal findings on diagnostic imaging of other specified body structures     Abnormal CT of the chest    Complete AV block (Multi) 2023    LAFB (left anterior fascicular block) 2023    Low vitamin B12 level 2023    Personal history of other diseases of the circulatory system 2022    History of complete atrioventricular block    Personal history of other medical treatment     History of echocardiogram    Right bundle branch block 2023       Surgical History:  Past Surgical History:   Procedure Laterality Date    OTHER SURGICAL HISTORY  2019    Cataract surgery    OTHER SURGICAL HISTORY  2019    Colonoscopy complete for polypectomy    OTHER SURGICAL HISTORY  2020    Pacemaker insertion    OTHER SURGICAL HISTORY  01/10/2020    Arterial angioplasty of lower extremity    OTHER SURGICAL HISTORY  10/17/2019    Femoropopliteal bypass    OTHER SURGICAL HISTORY  10/16/2019    Colonic polypectomy       Home Medications:  Prior to  Admission medications    Medication Sig Start Date End Date Taking? Authorizing Provider   albuterol 90 mcg/actuation inhaler INHALE 2 PUFFS BY MOUTH 4 TIMES DAILY AS NEEDED FOR WHEEZING   Yes Historical Provider, MD   amLODIPine (Norvasc) 10 mg tablet TAKE 1 TABLET BY MOUTH EVERY DAY 11/11/24  Yes Flory Hopson MD   aspirin 81 mg EC tablet Take 1 tablet (81 mg) by mouth once daily.   Yes Historical Provider, MD   atorvastatin (Lipitor) 20 mg tablet TAKE 1 TABLET BY MOUTH EVERY DAY 7/15/24  Yes Flory Hopson MD   chlorthalidone (Hygroton) 25 mg tablet TAKE 1 TABLET BY MOUTH EVERY DAY 11/11/24  Yes Flory Hopson MD   cyanocobalamin (Vitamin B-12) 1,000 mcg tablet Take 1 tablet (1,000 mcg) by mouth once daily.   Yes Historical Provider, MD   diphenhydrAMINE (BENADryl) 50 mg tablet Take 1 tablet (50 mg) by mouth as needed at bedtime for sleep.   Yes Historical Provider, MD   guaiFENesin (Mucinex) 600 mg 12 hr tablet Take 1 tablet (600 mg) by mouth 2 times a day as needed for cough. 8/21/24  Yes Julissa Foster APRN-CNP, DNP   lisinopril 40 mg tablet TAKE 1 TABLET (40 MG) BY MOUTH ONCE DAILY AS DIRECTED 11/11/24  Yes Flory Hopson MD   mirtazapine (Remeron) 15 mg tablet TAKE 1 TAB(S) ORAL ONCE A DAY (AT BEDTIME) 11/27/23  Yes Flory Hopson MD   nystatin (Mycostatin) 100,000 unit/gram powder Apply 1 Application topically 2 times a day. 11/26/24  Yes Flory Hopson MD   tiotropium (Spiriva with HandiHaler) 18 mcg inhalation capsule INHALE 1 CAPSULE VIA HANDIHALER ONCE DAILY AT THE SAME TIME EVERY DAY 10/25/18  Yes Historical Provider, MD   nystatin (Mycostatin) cream Apply topically 2 times a day for 7 days. apply to affected area 11/26/24 12/3/24  Flory Hopson MD       Allergies:  No Known Allergies  has No Known Allergies.    Family History:   Family History   Problem Relation Name Age of Onset    Cancer Mother      Alzheimer's disease Father      Cancer Sister         Social History:  Social History  "    Socioeconomic History    Marital status:    Tobacco Use    Smoking status: Every Day     Types: Cigarettes    Smokeless tobacco: Never   Vaping Use    Vaping status: Never Used   Substance and Sexual Activity    Alcohol use: Not Currently    Drug use: Never    Sexual activity: Defer       ROS:  Constitutional:  no fever, sweats, and chills  Cardiovascular: No chest pain  Respiratory: No cough or shortness of breath  Gastrointestinal: Denies blood  Genitourinary: no dysuria  Musculoskeletal: no weakness or swelling  Integumentary: no rashes  Neurological: no confusion  Endocrine: no heat or cold intolerance  Heme/Lymph: no easy bruising or bleeding    Objective:  Ht 1.778 m (5' 10\")   BMI 29.56 kg/m²     Physical Exam:  Constitutional: No acute distress, conversant, pleasant  Neurologic: alert and oriented  Psych: appropriate affect  Ears, Nose, Mouth and Throat: mucus membranes moist  Pulmonary: No labored breathing  Cardiovascular: Regular rate and rhythm  Abdomen: soft, non-distended, non-tender  Musculoskeletal: Moves all extremities, no edema  Skin: warm and dry  Bilateral groins with evidence of monilia and he actually has skin cracking in the creases.  In his  cleft there is some whitening of the skin probably from digital trauma and it is also splitting in the  cleft.  It is swollen over the perineum.    Assessment and Plan: Melecio Rivera is a 74 y.o. old male with personal history of colon polyps as well as anal leakage.  We discussed with the degree of inflammation it would be very difficult for him to tolerate a colon prep.  I think we need to aggressively treat this monilia.  We discussed to just gently rinse the area with a hand-held shower, pat it dry and then apply the medication.  If he moves his bowels we discussed using gentle infant wipes and then applying the medication.  It is probably easiest for him to put the powder in his groins and the cream in the  cleft.  We " discussed also leaving the area open to air is much as he can and possibly putting Hankey's in there to absorb excess sweat.  We also discussed wearing support socks at night to lessen his risk of digital trauma.  I will call him in a prescription of Diflucan to take for couple of days.  I will see him back in 3 weeks to reevaluate.      Nancy Harrison MD  12/10/2024

## 2024-12-12 DIAGNOSIS — B37.9 MONILIA INFECTION: Primary | ICD-10-CM

## 2024-12-12 RX ORDER — FLUCONAZOLE 150 MG/1
150 TABLET ORAL DAILY
Qty: 3 TABLET | Refills: 1 | Status: SHIPPED | OUTPATIENT
Start: 2024-12-12 | End: 2024-12-18

## 2024-12-17 ENCOUNTER — TELEPHONE (OUTPATIENT)
Dept: PHARMACY | Facility: HOSPITAL | Age: 74
End: 2024-12-17
Payer: MEDICARE

## 2024-12-17 NOTE — TELEPHONE ENCOUNTER
I reviewed the progress note and agree with the resident’s findings and plans as written. Case discussed with resident.    Leisa Machado, PharmD

## 2024-12-17 NOTE — TELEPHONE ENCOUNTER
Population Health: Outreach by Ambulatory Pharmacy Team    Patient: Melecio Rivera  Primary Care Provider (PCP): Flory Hopson MD  Payor: [employer] TIWARI  Reason: Adherence  Medication(s): Lisinopril 40mg  Outcome: Left Faridehil    Aline Oshea, PharmD  PGY-1 Pharmacy Resident

## 2024-12-30 ENCOUNTER — APPOINTMENT (OUTPATIENT)
Dept: SURGERY | Facility: CLINIC | Age: 74
End: 2024-12-30
Payer: MEDICARE

## 2024-12-30 VITALS
HEART RATE: 76 BPM | DIASTOLIC BLOOD PRESSURE: 80 MMHG | WEIGHT: 209 LBS | SYSTOLIC BLOOD PRESSURE: 122 MMHG | HEIGHT: 70 IN | BODY MASS INDEX: 29.92 KG/M2

## 2024-12-30 DIAGNOSIS — B37.9 MONILIA INFECTION: Primary | ICD-10-CM

## 2024-12-30 PROCEDURE — 1159F MED LIST DOCD IN RCRD: CPT | Performed by: SURGERY

## 2024-12-30 PROCEDURE — 99213 OFFICE O/P EST LOW 20 MIN: CPT | Performed by: SURGERY

## 2024-12-30 PROCEDURE — 3079F DIAST BP 80-89 MM HG: CPT | Performed by: SURGERY

## 2024-12-30 PROCEDURE — 4004F PT TOBACCO SCREEN RCVD TLK: CPT | Performed by: SURGERY

## 2024-12-30 PROCEDURE — 3074F SYST BP LT 130 MM HG: CPT | Performed by: SURGERY

## 2024-12-30 RX ORDER — NYSTATIN AND TRIAMCINOLONE ACETONIDE 100000; 1 [USP'U]/G; MG/G
CREAM TOPICAL 2 TIMES DAILY
Qty: 30 G | Refills: 2 | Status: SHIPPED | OUTPATIENT
Start: 2024-12-30 | End: 2025-01-24

## 2024-12-30 NOTE — LETTER
2024     Flory Hopson MD  [address] [address]    Patient: Melecio Rivera   YOB: 1950   Date of Visit: 2024       Dear Dr. Flory Hopson MD:    Thank you for referring Melecio Rivera to me for evaluation. Below are my notes for this consultation.  If you have questions, please do not hesitate to call me. I look forward to following your patient along with you.       Sincerely,     Nancy Harrison MD      CC: No Recipients  ______________________________________________________________________________________    Subjective   Patient ID: Melecio Rivera is a 74 y.o. male who presents for Follow-up (FU 3 weeks for anal leakage.  Patient states is doing better and not as irritated as before.  States using the wipes has helped a lot.  Patient getting slight leakage with coughing. ).  Patient used the Diflucan and had some improvement and has been keeping the area clean and dry and using the baby wipes.  He states he has run out of cream.  He asked me about hepatitis C as he has been doing some reading online and he states the skin on his back feels like thickened as well as on the back of his neck.  I have explained to him with all his skin questions is probably good if we refer him to a dermatologist.  He agrees to this and we will make the referral.  I told him that hepatitis C can only be diagnosed with the blood test and he can discuss this further at his next visit with Dr. Hopson.    Objective   Neck is ichthyotic but there are no lesions identified.  Back looks dry with somewhat thickened skin.  The monilia in his groins and his  cleft has improved and there is just a very small linear opening.    Assessment/Plan   I have encouraged him to continue with the same treatment keeping the area clean and dry and applying the topicals.  Since he will be seeing the dermatologist for his other skin issues they can follow-up on this and make any recommendations if it  is still there.  We will see him back on a as needed basis.  Nancy Harrison MD 12/30/24 1:11 PM

## 2024-12-30 NOTE — PROGRESS NOTES
Subjective   Patient ID: Melecio Rivera is a 74 y.o. male who presents for Follow-up (FU 3 weeks for anal leakage.  Patient states is doing better and not as irritated as before.  States using the wipes has helped a lot.  Patient getting slight leakage with coughing. ).  Patient used the Diflucan and had some improvement and has been keeping the area clean and dry and using the baby wipes.  He states he has run out of cream.  He asked me about hepatitis C as he has been doing some reading online and he states the skin on his back feels like thickened as well as on the back of his neck.  I have explained to him with all his skin questions is probably good if we refer him to a dermatologist.  He agrees to this and we will make the referral.  I told him that hepatitis C can only be diagnosed with the blood test and he can discuss this further at his next visit with Dr. Hopson.    Objective   Neck is ichthyotic but there are no lesions identified.  Back looks dry with somewhat thickened skin.  The monilia in his groins and his  cleft has improved and there is just a very small linear opening.    Assessment/Plan   I have encouraged him to continue with the same treatment keeping the area clean and dry and applying the topicals.  Since he will be seeing the dermatologist for his other skin issues they can follow-up on this and make any recommendations if it is still there.  We will see him back on a as needed basis.  Nancy Harrison MD 24 1:11 PM

## 2024-12-31 ENCOUNTER — APPOINTMENT (OUTPATIENT)
Dept: SURGERY | Facility: CLINIC | Age: 74
End: 2024-12-31
Payer: MEDICARE

## 2024-12-31 ENCOUNTER — TELEPHONE (OUTPATIENT)
Dept: SURGERY | Facility: CLINIC | Age: 74
End: 2024-12-31

## 2024-12-31 DIAGNOSIS — B37.9 MONILIA INFECTION: ICD-10-CM

## 2025-01-02 ENCOUNTER — TELEPHONE (OUTPATIENT)
Age: 75
End: 2025-01-02
Payer: MEDICARE

## 2025-01-02 NOTE — TELEPHONE ENCOUNTER
Calling to sched pt appt, per surgeon office, pt has questions for pcp on hep c.  Please sched appt

## 2025-01-07 ENCOUNTER — TELEPHONE (OUTPATIENT)
Dept: PHARMACY | Facility: HOSPITAL | Age: 75
End: 2025-01-07
Payer: MEDICARE

## 2025-01-08 DIAGNOSIS — E78.5 HYPERLIPIDEMIA, UNSPECIFIED HYPERLIPIDEMIA TYPE: ICD-10-CM

## 2025-01-23 DIAGNOSIS — I10 HYPERTENSION, UNSPECIFIED TYPE: ICD-10-CM

## 2025-01-23 DIAGNOSIS — I10 BENIGN HYPERTENSION: ICD-10-CM

## 2025-01-23 DIAGNOSIS — E78.5 HYPERLIPIDEMIA, UNSPECIFIED HYPERLIPIDEMIA TYPE: ICD-10-CM

## 2025-01-23 RX ORDER — ATORVASTATIN CALCIUM 20 MG/1
20 TABLET, FILM COATED ORAL DAILY
Qty: 90 TABLET | Refills: 1 | Status: SHIPPED | OUTPATIENT
Start: 2025-01-23

## 2025-01-23 RX ORDER — AMLODIPINE BESYLATE 10 MG/1
10 TABLET ORAL DAILY
Qty: 90 TABLET | Refills: 1 | Status: SHIPPED | OUTPATIENT
Start: 2025-01-23

## 2025-01-23 RX ORDER — CHLORTHALIDONE 25 MG/1
25 TABLET ORAL DAILY
Qty: 90 TABLET | Refills: 1 | Status: SHIPPED | OUTPATIENT
Start: 2025-01-23

## 2025-01-23 RX ORDER — LISINOPRIL 40 MG/1
40 TABLET ORAL DAILY
Qty: 90 TABLET | Refills: 1 | Status: SHIPPED | OUTPATIENT
Start: 2025-01-23

## 2025-01-28 ENCOUNTER — HOSPITAL ENCOUNTER (OUTPATIENT)
Dept: CARDIOLOGY | Facility: CLINIC | Age: 75
Discharge: HOME | End: 2025-01-28
Payer: MEDICARE

## 2025-01-28 DIAGNOSIS — Z95.0 PACEMAKER, ARTIFICIAL: ICD-10-CM

## 2025-01-28 DIAGNOSIS — I44.2 ATRIOVENTRICULAR BLOCK, COMPLETE (MULTI): ICD-10-CM

## 2025-02-20 ENCOUNTER — HOSPITAL ENCOUNTER (OUTPATIENT)
Dept: CARDIOLOGY | Facility: HOSPITAL | Age: 75
Discharge: HOME | End: 2025-02-20
Payer: MEDICARE

## 2025-02-20 DIAGNOSIS — I44.2 ATRIOVENTRICULAR BLOCK, COMPLETE (MULTI): ICD-10-CM

## 2025-02-20 DIAGNOSIS — Z95.0 PACEMAKER, ARTIFICIAL: ICD-10-CM

## 2025-02-20 DIAGNOSIS — Z95.0 PACEMAKER, ARTIFICIAL: Primary | ICD-10-CM

## 2025-02-20 PROCEDURE — 93280 PM DEVICE PROGR EVAL DUAL: CPT

## 2025-03-12 ENCOUNTER — APPOINTMENT (OUTPATIENT)
Age: 75
End: 2025-03-12
Payer: MEDICARE

## 2025-03-12 VITALS
SYSTOLIC BLOOD PRESSURE: 120 MMHG | DIASTOLIC BLOOD PRESSURE: 64 MMHG | HEART RATE: 60 BPM | BODY MASS INDEX: 30.49 KG/M2 | WEIGHT: 213 LBS | HEIGHT: 70 IN

## 2025-03-12 DIAGNOSIS — I71.21 ANEURYSM OF ASCENDING AORTA WITHOUT RUPTURE (CMS-HCC): ICD-10-CM

## 2025-03-12 DIAGNOSIS — N18.31 STAGE 3A CHRONIC KIDNEY DISEASE (MULTI): ICD-10-CM

## 2025-03-12 DIAGNOSIS — J43.8 OTHER EMPHYSEMA (MULTI): Primary | ICD-10-CM

## 2025-03-12 DIAGNOSIS — I10 BENIGN HYPERTENSION: ICD-10-CM

## 2025-03-12 DIAGNOSIS — F17.200 SMOKER: ICD-10-CM

## 2025-03-12 DIAGNOSIS — Z00.00 ROUTINE GENERAL MEDICAL EXAMINATION AT HEALTH CARE FACILITY: ICD-10-CM

## 2025-03-12 DIAGNOSIS — F33.2 ENDOGENOUS DEPRESSION (MULTI): ICD-10-CM

## 2025-03-12 PROCEDURE — 1160F RVW MEDS BY RX/DR IN RCRD: CPT | Performed by: FAMILY MEDICINE

## 2025-03-12 PROCEDURE — 99214 OFFICE O/P EST MOD 30 MIN: CPT | Performed by: FAMILY MEDICINE

## 2025-03-12 PROCEDURE — 3078F DIAST BP <80 MM HG: CPT | Performed by: FAMILY MEDICINE

## 2025-03-12 PROCEDURE — 1159F MED LIST DOCD IN RCRD: CPT | Performed by: FAMILY MEDICINE

## 2025-03-12 PROCEDURE — 4004F PT TOBACCO SCREEN RCVD TLK: CPT | Performed by: FAMILY MEDICINE

## 2025-03-12 PROCEDURE — 3074F SYST BP LT 130 MM HG: CPT | Performed by: FAMILY MEDICINE

## 2025-03-12 PROCEDURE — 1170F FXNL STATUS ASSESSED: CPT | Performed by: FAMILY MEDICINE

## 2025-03-12 PROCEDURE — 1124F ACP DISCUSS-NO DSCNMKR DOCD: CPT | Performed by: FAMILY MEDICINE

## 2025-03-12 PROCEDURE — G2211 COMPLEX E/M VISIT ADD ON: HCPCS | Performed by: FAMILY MEDICINE

## 2025-03-12 PROCEDURE — G0439 PPPS, SUBSEQ VISIT: HCPCS | Performed by: FAMILY MEDICINE

## 2025-03-12 RX ORDER — ALBUTEROL SULFATE 90 UG/1
2 INHALANT RESPIRATORY (INHALATION) EVERY 6 HOURS PRN
Qty: 18 G | Refills: 1 | Status: SHIPPED | OUTPATIENT
Start: 2025-03-12

## 2025-03-12 ASSESSMENT — ACTIVITIES OF DAILY LIVING (ADL)
TAKING_MEDICATION: INDEPENDENT
MANAGING_FINANCES: INDEPENDENT
DOING_HOUSEWORK: INDEPENDENT
BATHING: INDEPENDENT
GROCERY_SHOPPING: INDEPENDENT
DRESSING: INDEPENDENT

## 2025-03-12 ASSESSMENT — PATIENT HEALTH QUESTIONNAIRE - PHQ9
1. LITTLE INTEREST OR PLEASURE IN DOING THINGS: NOT AT ALL
SUM OF ALL RESPONSES TO PHQ9 QUESTIONS 1 AND 2: 0
2. FEELING DOWN, DEPRESSED OR HOPELESS: NOT AT ALL

## 2025-03-12 NOTE — PROGRESS NOTES
"Subjective   Reason for Visit: Melecio Rivera is an 75 y.o. male here for a Medicare Wellness visit.     Past Medical, Surgical, and Family History reviewed and updated in chart.    Reviewed all medications by prescribing practitioner or clinical pharmacist (such as prescriptions, OTCs, herbal therapies and supplements) and documented in the medical record.    HPI  Tired,  Some constipation.  States drinks no water.  Only drinks coffee.  Recommend start drinking 2-3 large cups water daily , and we'll see him back in a few weeks to follow up.  COPD, smokes, warned of risks.   Wants to switch to spiriva respimat, states rarely uses.   CAD, hyperlipidemia, on medications  Sees cardiology , re pacemaker, ascending aortic aneurysm    Patient Care Team:  Flory Hopson MD as PCP - General (Family Medicine)  Flory Hopson MD as PCP - [employer] PCP     Review of Systems   All other systems reviewed and are negative.      Objective   Vitals:  /64 (BP Location: Left arm, Patient Position: Sitting)   Pulse 60   Ht 1.778 m (5' 10\")   Wt 96.6 kg (213 lb)   BMI 30.56 kg/m²       Physical Exam  Vitals and nursing note reviewed.   Constitutional:       General: He is not in acute distress.     Appearance: Normal appearance. He is not toxic-appearing.   HENT:      Head: Normocephalic and atraumatic.   Cardiovascular:      Rate and Rhythm: Normal rate and regular rhythm.      Heart sounds: No murmur heard.  Pulmonary:      Effort: Pulmonary effort is normal.      Breath sounds: Normal breath sounds.   Musculoskeletal:      Cervical back: Neck supple. No rigidity.      Comments:     Skin:     General: Skin is warm and dry.   Neurological:      General: No focal deficit present.      Mental Status: He is alert and oriented to person, place, and time.   Psychiatric:         Mood and Affect: Mood normal.         Behavior: Behavior normal.         Assessment & Plan  Other emphysema (Multi)    Orders:    albuterol " 90 mcg/actuation inhaler; Inhale 2 puffs every 6 hours if needed for wheezing.    tiotropium (Spiriva Respimat) 1.25 mcg/actuation inhaler; Inhale 2 puffs once daily.    Endogenous depression (Multi)         Stage 3a chronic kidney disease (Multi)         Routine general medical examination at health care facility         Smoker         Benign hypertension         Aneurysm of ascending aorta without rupture (CMS-HCC)          As per hpi, followup in a few weeks after labs.  Call concerns.

## 2025-03-12 NOTE — ASSESSMENT & PLAN NOTE
Orders:    albuterol 90 mcg/actuation inhaler; Inhale 2 puffs every 6 hours if needed for wheezing.    tiotropium (Spiriva Respimat) 1.25 mcg/actuation inhaler; Inhale 2 puffs once daily.

## 2025-03-17 ENCOUNTER — APPOINTMENT (OUTPATIENT)
Dept: URBAN - METROPOLITAN AREA CLINIC 204 | Age: 75
Setting detail: DERMATOLOGY
End: 2025-03-17

## 2025-03-17 DIAGNOSIS — L20.89 OTHER ATOPIC DERMATITIS: ICD-10-CM

## 2025-03-17 DIAGNOSIS — L57.8 OTHER SKIN CHANGES DUE TO CHRONIC EXPOSURE TO NONIONIZING RADIATION: ICD-10-CM

## 2025-03-17 DIAGNOSIS — D22 MELANOCYTIC NEVI: ICD-10-CM

## 2025-03-17 DIAGNOSIS — L82.1 OTHER SEBORRHEIC KERATOSIS: ICD-10-CM

## 2025-03-17 DIAGNOSIS — L57.0 ACTINIC KERATOSIS: ICD-10-CM

## 2025-03-17 PROBLEM — D22.4 MELANOCYTIC NEVI OF SCALP AND NECK: Status: ACTIVE | Noted: 2025-03-17

## 2025-03-17 PROCEDURE — OTHER COUNSELING: OTHER

## 2025-03-17 PROCEDURE — OTHER MIPS QUALITY: OTHER

## 2025-03-17 PROCEDURE — OTHER LIQUID NITROGEN: OTHER

## 2025-03-17 PROCEDURE — OTHER RECOMMENDATIONS: OTHER

## 2025-03-17 PROCEDURE — 17000 DESTRUCT PREMALG LESION: CPT

## 2025-03-17 PROCEDURE — 99203 OFFICE O/P NEW LOW 30 MIN: CPT | Mod: 25

## 2025-03-17 ASSESSMENT — LOCATION ZONE DERM
LOCATION ZONE: NECK
LOCATION ZONE: TRUNK
LOCATION ZONE: ARM

## 2025-03-17 ASSESSMENT — SEVERITY ASSESSMENT 2020: SEVERITY 2020: MILD

## 2025-03-17 ASSESSMENT — LOCATION SIMPLE DESCRIPTION DERM
LOCATION SIMPLE: POSTERIOR NECK
LOCATION SIMPLE: RIGHT UPPER BACK
LOCATION SIMPLE: LEFT FOREARM
LOCATION SIMPLE: TRAPEZIAL NECK
LOCATION SIMPLE: UPPER BACK

## 2025-03-17 ASSESSMENT — LOCATION DETAILED DESCRIPTION DERM
LOCATION DETAILED: RIGHT MEDIAL UPPER BACK
LOCATION DETAILED: SUPERIOR THORACIC SPINE
LOCATION DETAILED: MID TRAPEZIAL NECK
LOCATION DETAILED: RIGHT MEDIAL TRAPEZIAL NECK
LOCATION DETAILED: LEFT PROXIMAL DORSAL FOREARM

## 2025-03-17 ASSESSMENT — BSA ECZEMA: % BODY COVERED IN ECZEMA: 5

## 2025-03-17 ASSESSMENT — ITCH NUMERIC RATING SCALE: HOW SEVERE IS YOUR ITCHING?: 3

## 2025-03-17 NOTE — HPI: RASH
What Type Of Note Output Would You Prefer (Optional)?: Bullet Format
Is The Patient Presenting As Previously Scheduled?: Yes
Is This A New Presentation, Or A Follow-Up?: Rash
Additional History: Pt states that he gets scaly/bumpy skin on his back but it does not itch or burn. He states that he does not currently put anything on his back.

## 2025-03-17 NOTE — PROCEDURE: RECOMMENDATIONS
Render Risk Assessment In Note?: no
Recommendation Preamble: The following recommendations were made during the visit:
Recommendations (Free Text): Moisturize with CeraVe cream.
Detail Level: Zone

## 2025-03-17 NOTE — PROCEDURE: LIQUID NITROGEN
Show Applicator Variable?: Yes
Number Of Freeze-Thaw Cycles: 2 freeze-thaw cycles
Duration Of Freeze Thaw-Cycle (Seconds): 5
Render Note In Bullet Format When Appropriate: No
Detail Level: Detailed
Post-Care Instructions: I reviewed with the patient in detail post-care instructions. Patient is to wear sunprotection, and avoid picking at any of the treated lesions. Pt may apply Vaseline to crusted or scabbing areas.
Aperture Size (Optional): C
Consent: The patient's verbal consent was obtained including but not limited to risks of crusting, scabbing, blistering, scarring, darker or lighter pigmentary change, recurrence, incomplete removal and infection.
Application Tool (Optional): Liquid Nitrogen Sprayer

## 2025-03-22 LAB
ALBUMIN SERPL-MCNC: 4.7 G/DL (ref 3.6–5.1)
ALP SERPL-CCNC: 96 U/L (ref 35–144)
ALT SERPL-CCNC: 25 U/L (ref 9–46)
ANION GAP SERPL CALCULATED.4IONS-SCNC: 12 MMOL/L (CALC) (ref 7–17)
AST SERPL-CCNC: 20 U/L (ref 10–35)
BASOPHILS # BLD AUTO: 131 CELLS/UL (ref 0–200)
BASOPHILS NFR BLD AUTO: 1 %
BILIRUB SERPL-MCNC: 0.7 MG/DL (ref 0.2–1.2)
BUN SERPL-MCNC: 21 MG/DL (ref 7–25)
CALCIUM SERPL-MCNC: 9.7 MG/DL (ref 8.6–10.3)
CHLORIDE SERPL-SCNC: 103 MMOL/L (ref 98–110)
CHOLEST SERPL-MCNC: 123 MG/DL
CHOLEST/HDLC SERPL: 2.5 (CALC)
CO2 SERPL-SCNC: 24 MMOL/L (ref 20–32)
CREAT SERPL-MCNC: 1.6 MG/DL (ref 0.7–1.28)
EGFRCR SERPLBLD CKD-EPI 2021: 44 ML/MIN/1.73M2
EOSINOPHIL # BLD AUTO: 236 CELLS/UL (ref 15–500)
EOSINOPHIL NFR BLD AUTO: 1.8 %
ERYTHROCYTE [DISTWIDTH] IN BLOOD BY AUTOMATED COUNT: 12.3 % (ref 11–15)
EST. AVERAGE GLUCOSE BLD GHB EST-MCNC: 105 MG/DL
EST. AVERAGE GLUCOSE BLD GHB EST-SCNC: 5.8 MMOL/L
GLUCOSE SERPL-MCNC: 98 MG/DL (ref 65–99)
HBA1C MFR BLD: 5.3 % OF TOTAL HGB
HCT VFR BLD AUTO: 47.2 % (ref 38.5–50)
HDLC SERPL-MCNC: 49 MG/DL
HGB BLD-MCNC: 15.7 G/DL (ref 13.2–17.1)
LDLC SERPL CALC-MCNC: 59 MG/DL (CALC)
LYMPHOCYTES # BLD AUTO: 3550 CELLS/UL (ref 850–3900)
LYMPHOCYTES NFR BLD AUTO: 27.1 %
MCH RBC QN AUTO: 31.4 PG (ref 27–33)
MCHC RBC AUTO-ENTMCNC: 33.3 G/DL (ref 32–36)
MCV RBC AUTO: 94.4 FL (ref 80–100)
MONOCYTES # BLD AUTO: 1074 CELLS/UL (ref 200–950)
MONOCYTES NFR BLD AUTO: 8.2 %
NEUTROPHILS # BLD AUTO: 8109 CELLS/UL (ref 1500–7800)
NEUTROPHILS NFR BLD AUTO: 61.9 %
NONHDLC SERPL-MCNC: 74 MG/DL (CALC)
PLATELET # BLD AUTO: 200 THOUSAND/UL (ref 140–400)
PMV BLD REES-ECKER: 11.5 FL (ref 7.5–12.5)
POTASSIUM SERPL-SCNC: 4.2 MMOL/L (ref 3.5–5.3)
PROT SERPL-MCNC: 7.2 G/DL (ref 6.1–8.1)
RBC # BLD AUTO: 5 MILLION/UL (ref 4.2–5.8)
SODIUM SERPL-SCNC: 139 MMOL/L (ref 135–146)
TRIGL SERPL-MCNC: 69 MG/DL
TSH SERPL-ACNC: 1.39 MIU/L (ref 0.4–4.5)
VIT B12 SERPL-MCNC: 291 PG/ML (ref 200–1100)
WBC # BLD AUTO: 13.1 THOUSAND/UL (ref 3.8–10.8)

## 2025-04-01 ENCOUNTER — APPOINTMENT (OUTPATIENT)
Age: 75
End: 2025-04-01
Payer: MEDICARE

## 2025-04-01 VITALS
DIASTOLIC BLOOD PRESSURE: 74 MMHG | BODY MASS INDEX: 30.49 KG/M2 | HEIGHT: 70 IN | WEIGHT: 213 LBS | SYSTOLIC BLOOD PRESSURE: 134 MMHG | HEART RATE: 64 BPM

## 2025-04-01 DIAGNOSIS — F17.200 SMOKER: Primary | ICD-10-CM

## 2025-04-01 DIAGNOSIS — J43.8 OTHER EMPHYSEMA (MULTI): ICD-10-CM

## 2025-04-01 DIAGNOSIS — I10 BENIGN HYPERTENSION: ICD-10-CM

## 2025-04-01 DIAGNOSIS — E78.49 OTHER HYPERLIPIDEMIA: ICD-10-CM

## 2025-04-01 PROCEDURE — 3078F DIAST BP <80 MM HG: CPT | Performed by: FAMILY MEDICINE

## 2025-04-01 PROCEDURE — 3075F SYST BP GE 130 - 139MM HG: CPT | Performed by: FAMILY MEDICINE

## 2025-04-01 PROCEDURE — 99214 OFFICE O/P EST MOD 30 MIN: CPT | Performed by: FAMILY MEDICINE

## 2025-04-01 PROCEDURE — 1159F MED LIST DOCD IN RCRD: CPT | Performed by: FAMILY MEDICINE

## 2025-04-01 PROCEDURE — 4004F PT TOBACCO SCREEN RCVD TLK: CPT | Performed by: FAMILY MEDICINE

## 2025-04-01 PROCEDURE — G2211 COMPLEX E/M VISIT ADD ON: HCPCS | Performed by: FAMILY MEDICINE

## 2025-04-01 PROCEDURE — 1160F RVW MEDS BY RX/DR IN RCRD: CPT | Performed by: FAMILY MEDICINE

## 2025-04-01 NOTE — PROGRESS NOTES
Patient presents for periodic surveillance of chronic medical problems.     Subjective  Melecio Rivera is a 75 y.o. male who presents for Follow-up.  HPI  Follow up labs.    Elevated wbc felt secondary to smoking.   He is interested in semaglutide for weight loss. I do not recommend that for him for several reasons,.  He states he currently drinks 3 beers daily, and we talked about how quitting that would be the better way to lose weight.  In addition, without diabetes, I don't think his insurance will cover it.     Review of Systems   All other systems reviewed and are negative.  .    No Known Allergies    Current Outpatient Medications on File Prior to Visit   Medication Sig Dispense Refill    albuterol 90 mcg/actuation inhaler Inhale 2 puffs every 6 hours if needed for wheezing. 18 g 1    amLODIPine (Norvasc) 10 mg tablet Take 1 tablet (10 mg) by mouth once daily. 90 tablet 1    aspirin 81 mg EC tablet Take 1 tablet (81 mg) by mouth once daily.      atorvastatin (Lipitor) 20 mg tablet Take 1 tablet (20 mg) by mouth once daily. 90 tablet 1    chlorthalidone (Hygroton) 25 mg tablet Take 1 tablet (25 mg) by mouth once daily. 90 tablet 1    cyanocobalamin (Vitamin B-12) 1,000 mcg tablet Take 1 tablet (1,000 mcg) by mouth once daily.      diphenhydrAMINE (BENADryl) 50 mg tablet Take 1 tablet (50 mg) by mouth as needed at bedtime for sleep.      guaiFENesin (Mucinex) 600 mg 12 hr tablet Take 1 tablet (600 mg) by mouth 2 times a day as needed for cough. 60 tablet 3    lisinopril 40 mg tablet Take 1 tablet (40 mg) by mouth once daily. as directed 90 tablet 1    mirtazapine (Remeron) 15 mg tablet TAKE 1 TAB(S) ORAL ONCE A DAY (AT BEDTIME) 90 tablet 1    nystatin (Mycostatin) 100,000 unit/gram powder Apply 1 Application topically 2 times a day. 60 g 3    tiotropium (Spiriva Respimat) 1.25 mcg/actuation inhaler Inhale 2 puffs once daily. 8 g 11    tiotropium (Spiriva with HandiHaler) 18 mcg inhalation capsule INHALE 1  CAPSULE VIA HANDIHALER ONCE DAILY AT THE SAME TIME EVERY DAY       No current facility-administered medications on file prior to visit.       Patient Active Problem List   Diagnosis    Benign hypertension    Carotid artery stenosis, asymptomatic, bilateral    Emphysema/COPD (Multi)    Endogenous depression (Multi)    Hyperlipidemia    Pacemaker, artificial    PVD (peripheral vascular disease) (CMS-HCC)    Thoracic aortic aneurysm without rupture    Tubular adenoma of colon    Smoker    Stage 3a chronic kidney disease (Multi)       Objective     Visit Vitals  /74 (BP Location: Left arm, Patient Position: Sitting)   Pulse 64      Physical Exam  Vitals and nursing note reviewed.   Constitutional:       General: He is not in acute distress.     Appearance: Normal appearance. He is not toxic-appearing.   HENT:      Head: Normocephalic and atraumatic.   Cardiovascular:      Rate and Rhythm: Normal rate and regular rhythm.      Heart sounds: No murmur heard.  Pulmonary:      Effort: Pulmonary effort is normal.      Breath sounds: Normal breath sounds.   Musculoskeletal:      Cervical back: Neck supple. No rigidity.      Comments:     Skin:     General: Skin is warm and dry.   Neurological:      General: No focal deficit present.      Mental Status: He is alert and oriented to person, place, and time.   Psychiatric:         Mood and Affect: Mood normal.         Behavior: Behavior normal.       Orders Only on 03/21/2025   Component Date Value Ref Range Status    CHOLESTEROL, TOTAL 03/21/2025 123  <200 mg/dL Final    HDL CHOLESTEROL 03/21/2025 49  > OR = 40 mg/dL Final    TRIGLYCERIDES 03/21/2025 69  <150 mg/dL Final    LDL-CHOLESTEROL 03/21/2025 59  mg/dL (calc) Final    Comment: Reference range: <100     Desirable range <100 mg/dL for primary prevention;    <70 mg/dL for patients with CHD or diabetic patients   with > or = 2 CHD risk factors.     LDL-C is now calculated using the Hebert-Cantu   calculation, which is  a validated novel method providing   better accuracy than the Friedewald equation in the   estimation of LDL-C.   Hebert SS et al. RJ. 2013;310(19): 3669-7601   (http://education.[employer]/faq/UAZ191)      CHOL/HDLC RATIO 03/21/2025 2.5  <5.0 (calc) Final    NON HDL CHOLESTEROL 03/21/2025 74  <130 mg/dL (calc) Final    Comment: For patients with diabetes plus 1 major ASCVD risk   factor, treating to a non-HDL-C goal of <100 mg/dL   (LDL-C of <70 mg/dL) is considered a therapeutic   option.      GLUCOSE 03/21/2025 98  65 - 99 mg/dL Final    Comment:               Fasting reference interval         UREA NITROGEN (BUN) 03/21/2025 21  7 - 25 mg/dL Final    CREATININE 03/21/2025 1.60 (H)  0.70 - 1.28 mg/dL Final    EGFR 03/21/2025 44 (L)  > OR = 60 mL/min/1.73m2 Final    SODIUM 03/21/2025 139  135 - 146 mmol/L Final    POTASSIUM 03/21/2025 4.2  3.5 - 5.3 mmol/L Final    CHLORIDE 03/21/2025 103  98 - 110 mmol/L Final    CARBON DIOXIDE 03/21/2025 24  20 - 32 mmol/L Final    ELECTROLYTE BALANCE 03/21/2025 12  7 - 17 mmol/L (calc) Final    CALCIUM 03/21/2025 9.7  8.6 - 10.3 mg/dL Final    PROTEIN, TOTAL 03/21/2025 7.2  6.1 - 8.1 g/dL Final    ALBUMIN 03/21/2025 4.7  3.6 - 5.1 g/dL Final    BILIRUBIN, TOTAL 03/21/2025 0.7  0.2 - 1.2 mg/dL Final    ALKALINE PHOSPHATASE 03/21/2025 96  35 - 144 U/L Final    AST 03/21/2025 20  10 - 35 U/L Final    ALT 03/21/2025 25  9 - 46 U/L Final    WHITE BLOOD CELL COUNT 03/21/2025 13.1 (H)  3.8 - 10.8 Thousand/uL Final    RED BLOOD CELL COUNT 03/21/2025 5.00  4.20 - 5.80 Million/uL Final    HEMOGLOBIN 03/21/2025 15.7  13.2 - 17.1 g/dL Final    HEMATOCRIT 03/21/2025 47.2  38.5 - 50.0 % Final    MCV 03/21/2025 94.4  80.0 - 100.0 fL Final    MCH 03/21/2025 31.4  27.0 - 33.0 pg Final    MCHC 03/21/2025 33.3  32.0 - 36.0 g/dL Final    Comment: For adults, a slight decrease in the calculated MCHC  value (in the range of 30 to 32 g/dL) is most likely  not clinically significant;  however, it should be  interpreted with caution in correlation with other  red cell parameters and the patient's clinical  condition.      RDW 03/21/2025 12.3  11.0 - 15.0 % Final    PLATELET COUNT 03/21/2025 200  140 - 400 Thousand/uL Final    MPV 03/21/2025 11.5  7.5 - 12.5 fL Final    ABSOLUTE NEUTROPHILS 03/21/2025 8,109 (H)  1,500 - 7,800 cells/uL Final    ABSOLUTE LYMPHOCYTES 03/21/2025 3,550  850 - 3,900 cells/uL Final    ABSOLUTE MONOCYTES 03/21/2025 1,074 (H)  200 - 950 cells/uL Final    ABSOLUTE EOSINOPHILS 03/21/2025 236  15 - 500 cells/uL Final    ABSOLUTE BASOPHILS 03/21/2025 131  0 - 200 cells/uL Final    NEUTROPHILS 03/21/2025 61.9  % Final    LYMPHOCYTES 03/21/2025 27.1  % Final    MONOCYTES 03/21/2025 8.2  % Final    EOSINOPHILS 03/21/2025 1.8  % Final    BASOPHILS 03/21/2025 1.0  % Final    VITAMIN B12 03/21/2025 291  200 - 1,100 pg/mL Final    Comment:    Please Note: Although the reference range for vitamin  B12 is 200-1100 pg/mL, it has been reported that between  5 and 10% of patients with values between 200 and 400  pg/mL may experience neuropsychiatric and hematologic  abnormalities due to occult B12 deficiency; less than 1%  of patients with values above 400 pg/mL will have symptoms.         TSH W/REFLEX TO FT4 03/21/2025 1.39  0.40 - 4.50 mIU/L Final    HEMOGLOBIN A1c 03/21/2025 5.3  <5.7 % of total Hgb Final    Comment: For the purpose of screening for the presence of  diabetes:     <5.7%       Consistent with the absence of diabetes  5.7-6.4%    Consistent with increased risk for diabetes              (prediabetes)  > or =6.5%  Consistent with diabetes     This assay result is consistent with a decreased risk  of diabetes.     Currently, no consensus exists regarding use of  hemoglobin A1c for diagnosis of diabetes in children.     According to American Diabetes Association (ADA)  guidelines, hemoglobin A1c <7.0% represents optimal  control in non-pregnant diabetic patients.  Different  metrics may apply to specific patient populations.   Standards of Medical Care in Diabetes(ADA).          eAG (mg/dL) 03/21/2025 105  mg/dL Final    eAG (mmol/L) 03/21/2025 5.8  mmol/L Final         Assessment/Plan   Problem List Items Addressed This Visit       Benign hypertension    Emphysema/COPD (Multi)    Hyperlipidemia    Smoker - Primary        Follow up six months, no labs.       Flory Hopson MD

## 2025-04-02 ENCOUNTER — APPOINTMENT (OUTPATIENT)
Age: 75
End: 2025-04-02
Payer: MEDICARE

## 2025-04-29 ENCOUNTER — HOSPITAL ENCOUNTER (OUTPATIENT)
Dept: CARDIOLOGY | Facility: CLINIC | Age: 75
Discharge: HOME | End: 2025-04-29
Payer: MEDICARE

## 2025-04-29 DIAGNOSIS — I44.2 ATRIOVENTRICULAR BLOCK, COMPLETE (MULTI): ICD-10-CM

## 2025-04-29 DIAGNOSIS — Z95.0 PACEMAKER, ARTIFICIAL: ICD-10-CM

## 2025-05-15 ENCOUNTER — HOSPITAL ENCOUNTER (OUTPATIENT)
Dept: CARDIOLOGY | Facility: CLINIC | Age: 75
Discharge: HOME | End: 2025-05-15
Payer: MEDICARE

## 2025-05-15 DIAGNOSIS — I44.2 ATRIOVENTRICULAR BLOCK, COMPLETE (MULTI): ICD-10-CM

## 2025-05-15 DIAGNOSIS — Z95.0 PACEMAKER, ARTIFICIAL: ICD-10-CM

## 2025-05-19 DIAGNOSIS — I10 HYPERTENSION, UNSPECIFIED TYPE: ICD-10-CM

## 2025-05-19 DIAGNOSIS — F33.2 ENDOGENOUS DEPRESSION (MULTI): ICD-10-CM

## 2025-05-19 DIAGNOSIS — I10 BENIGN HYPERTENSION: ICD-10-CM

## 2025-05-19 DIAGNOSIS — E78.5 HYPERLIPIDEMIA, UNSPECIFIED HYPERLIPIDEMIA TYPE: ICD-10-CM

## 2025-05-19 DIAGNOSIS — J43.8 OTHER EMPHYSEMA (MULTI): ICD-10-CM

## 2025-05-19 RX ORDER — LISINOPRIL 40 MG/1
40 TABLET ORAL DAILY
Qty: 90 TABLET | Refills: 1 | Status: SHIPPED | OUTPATIENT
Start: 2025-05-19

## 2025-05-19 RX ORDER — ATORVASTATIN CALCIUM 20 MG/1
20 TABLET, FILM COATED ORAL DAILY
Qty: 90 TABLET | Refills: 1 | Status: SHIPPED | OUTPATIENT
Start: 2025-05-19

## 2025-05-19 RX ORDER — ALBUTEROL SULFATE 90 UG/1
2 INHALANT RESPIRATORY (INHALATION) EVERY 6 HOURS PRN
Qty: 18 G | Refills: 1 | Status: SHIPPED | OUTPATIENT
Start: 2025-05-19

## 2025-05-19 RX ORDER — MIRTAZAPINE 15 MG/1
TABLET, FILM COATED ORAL
Qty: 90 TABLET | Refills: 1 | Status: SHIPPED | OUTPATIENT
Start: 2025-05-19

## 2025-05-19 RX ORDER — CHLORTHALIDONE 25 MG/1
25 TABLET ORAL DAILY
Qty: 90 TABLET | Refills: 1 | Status: SHIPPED | OUTPATIENT
Start: 2025-05-19

## 2025-05-19 RX ORDER — AMLODIPINE BESYLATE 10 MG/1
10 TABLET ORAL DAILY
Qty: 90 TABLET | Refills: 1 | Status: SHIPPED | OUTPATIENT
Start: 2025-05-19

## 2025-07-16 ENCOUNTER — HOSPITAL ENCOUNTER (OUTPATIENT)
Dept: CARDIOLOGY | Facility: CLINIC | Age: 75
Discharge: HOME | End: 2025-07-16
Payer: MEDICARE

## 2025-07-16 DIAGNOSIS — Z95.0 PACEMAKER, ARTIFICIAL: ICD-10-CM

## 2025-07-16 DIAGNOSIS — I44.2 ATRIOVENTRICULAR BLOCK, COMPLETE (MULTI): ICD-10-CM

## 2025-07-16 PROCEDURE — 93296 REM INTERROG EVL PM/IDS: CPT

## 2025-08-27 ENCOUNTER — APPOINTMENT (OUTPATIENT)
Dept: CARDIOLOGY | Facility: CLINIC | Age: 75
End: 2025-08-27
Payer: MEDICARE

## 2025-10-01 ENCOUNTER — APPOINTMENT (OUTPATIENT)
Age: 75
End: 2025-10-01
Payer: MEDICARE

## 2026-02-24 ENCOUNTER — APPOINTMENT (OUTPATIENT)
Dept: CARDIOLOGY | Facility: HOSPITAL | Age: 76
End: 2026-02-24
Payer: MEDICARE